# Patient Record
Sex: FEMALE | Race: WHITE | NOT HISPANIC OR LATINO | Employment: OTHER | URBAN - NONMETROPOLITAN AREA
[De-identification: names, ages, dates, MRNs, and addresses within clinical notes are randomized per-mention and may not be internally consistent; named-entity substitution may affect disease eponyms.]

---

## 2018-03-05 ENCOUNTER — HOSPITAL ENCOUNTER (OUTPATIENT)
Dept: LAB | Facility: MEDICAL CENTER | Age: 78
End: 2018-03-05
Payer: MEDICARE

## 2018-03-05 PROCEDURE — 36415 COLL VENOUS BLD VENIPUNCTURE: CPT

## 2018-06-05 ENCOUNTER — APPOINTMENT (RX ONLY)
Dept: URBAN - NONMETROPOLITAN AREA CLINIC 15 | Facility: CLINIC | Age: 78
Setting detail: DERMATOLOGY
End: 2018-06-05

## 2018-06-05 DIAGNOSIS — L85.3 XEROSIS CUTIS: ICD-10-CM

## 2018-06-05 DIAGNOSIS — R21 RASH AND OTHER NONSPECIFIC SKIN ERUPTION: ICD-10-CM

## 2018-06-05 DIAGNOSIS — L29.8 OTHER PRURITUS: ICD-10-CM

## 2018-06-05 DIAGNOSIS — L29.89 OTHER PRURITUS: ICD-10-CM

## 2018-06-05 PROCEDURE — ? ORDER TESTS

## 2018-06-05 PROCEDURE — 99203 OFFICE O/P NEW LOW 30 MIN: CPT | Mod: 25

## 2018-06-05 PROCEDURE — 11101: CPT

## 2018-06-05 PROCEDURE — 11100: CPT

## 2018-06-05 PROCEDURE — ? BIOPSY BY PUNCH METHOD

## 2018-06-05 PROCEDURE — ? PRESCRIPTION

## 2018-06-05 PROCEDURE — ? COUNSELING

## 2018-06-05 PROCEDURE — ? PATIENT SPECIFIC COUNSELING

## 2018-06-05 PROCEDURE — ? BIOPSY BY SHAVE METHOD

## 2018-06-05 RX ORDER — TRIAMCINOLONE ACETONIDE 1 MG/G
CREAM TOPICAL
Qty: 1 | Refills: 3 | Status: ERX | COMMUNITY
Start: 2018-06-05

## 2018-06-05 RX ADMIN — TRIAMCINOLONE ACETONIDE: 1 CREAM TOPICAL at 00:00

## 2018-06-05 ASSESSMENT — LOCATION DETAILED DESCRIPTION DERM
LOCATION DETAILED: INFERIOR THORACIC SPINE
LOCATION DETAILED: LOWER STERNUM
LOCATION DETAILED: LEFT PROXIMAL CALF
LOCATION DETAILED: RIGHT PROXIMAL DORSAL FOREARM
LOCATION DETAILED: LEFT VENTRAL DISTAL FOREARM
LOCATION DETAILED: LEFT RADIAL DORSAL HAND
LOCATION DETAILED: RIGHT PROXIMAL CALF
LOCATION DETAILED: LEFT ANTERIOR DISTAL UPPER ARM
LOCATION DETAILED: RIGHT PROXIMAL PRETIBIAL REGION
LOCATION DETAILED: UPPER STERNUM
LOCATION DETAILED: RIGHT SUPERIOR MEDIAL MIDBACK
LOCATION DETAILED: LEFT PROXIMAL DORSAL FOREARM
LOCATION DETAILED: RIGHT VENTRAL LATERAL DISTAL FOREARM
LOCATION DETAILED: LEFT VENTRAL PROXIMAL FOREARM
LOCATION DETAILED: RIGHT VENTRAL PROXIMAL FOREARM
LOCATION DETAILED: RIGHT RADIAL DORSAL HAND
LOCATION DETAILED: LEFT INFERIOR ANTERIOR NECK
LOCATION DETAILED: LEFT PROXIMAL PRETIBIAL REGION

## 2018-06-05 ASSESSMENT — LOCATION SIMPLE DESCRIPTION DERM
LOCATION SIMPLE: LEFT PRETIBIAL REGION
LOCATION SIMPLE: RIGHT PRETIBIAL REGION
LOCATION SIMPLE: LEFT ANTERIOR NECK
LOCATION SIMPLE: LEFT UPPER ARM
LOCATION SIMPLE: RIGHT LOWER BACK
LOCATION SIMPLE: LEFT FOREARM
LOCATION SIMPLE: LEFT CALF
LOCATION SIMPLE: UPPER BACK
LOCATION SIMPLE: LEFT HAND
LOCATION SIMPLE: CHEST
LOCATION SIMPLE: RIGHT HAND
LOCATION SIMPLE: RIGHT FOREARM
LOCATION SIMPLE: RIGHT CALF

## 2018-06-05 ASSESSMENT — LOCATION ZONE DERM
LOCATION ZONE: LEG
LOCATION ZONE: NECK
LOCATION ZONE: HAND
LOCATION ZONE: TRUNK
LOCATION ZONE: ARM

## 2018-06-05 NOTE — PROCEDURE: BIOPSY BY SHAVE METHOD
Additional Anesthesia Volume In Cc (Will Not Render If 0): 0
Bill For Surgical Tray: no
Anesthesia Type: 1% lidocaine with epinephrine
Hemostasis: Electrocautery
Consent: Written consent was obtained and risks were reviewed including but not limited to scarring, infection, bleeding, scabbing, incomplete removal, nerve damage and allergy to anesthesia.
Lab Facility: 
Wound Care: Vaseline
Anesthesia Volume In Cc: 0.5
Detail Level: Detailed
Dressing: bandage
Curettage Text: The wound bed was treated with curettage after the biopsy was performed.
Notification Instructions: Patient will be notified of biopsy results. However, patient instructed to call the office if not contacted within 2 weeks.
Biopsy Type: H and E
Was A Bandage Applied: Yes
Type Of Destruction Used: Electrodesiccation
Electrodesiccation Text: The wound bed was treated with electrodesiccation after the biopsy was performed.
Biopsy Method: Personna blade
Lab: [id number]
Electrodesiccation And Curettage Text: The wound bed was treated with electrodesiccation and curettage after the biopsy was performed.
Billing Type: Third-Party Bill
Post-Care Instructions: I reviewed with the patient in detail post-care instructions. Patient is to keep the biopsy site dry overnight, and then apply bacitracin twice daily until healed. Patient may apply hydrogen peroxide soaks to remove any crusting.
Cryotherapy Text: The wound bed was treated with cryotherapy after the biopsy was performed.
Silver Nitrate Text: The wound bed was treated with silver nitrate after the biopsy was performed.

## 2018-06-05 NOTE — PROCEDURE: BIOPSY BY PUNCH METHOD
Epidermal Sutures: 4-0 Ethilon
Billing Type: Third-Party Bill
Detail Level: Detailed
Lab: [id number]
Bill 21732 For Specimen Handling/Conveyance To Laboratory?: no
Biopsy Type: H and E
X Size Of Lesion In Cm (Optional): 0
Notification Instructions: Patient will be notified of biopsy results. However, patient instructed to call the office if not contacted within 2 weeks.
Dressing: bandage
Home Suture Removal Text: Patient was provided a home suture removal kit and will remove their sutures at home.  If they have any questions or difficulties they will call the office.
Was A Bandage Applied: Yes
Post-Care Instructions: I reviewed with the patient in detail post-care instructions. Patient is to keep the biopsy site dry overnight, and then apply bacitracin twice daily until healed. Patient may apply hydrogen peroxide soaks to remove any crusting.
Suture Removal: 14 days
Punch Size In Mm: 4
Wound Care: Petrolatum
Consent: Written consent was obtained and risks were reviewed including but not limited to scarring, infection, bleeding, scabbing, incomplete removal, nerve damage and allergy to anesthesia.
Anesthesia Volume In Cc: 0.5
Hemostasis: None
Anesthesia Type: 1% lidocaine with epinephrine
Lab Facility:

## 2018-06-05 NOTE — PROCEDURE: PATIENT SPECIFIC COUNSELING
Detail Level: Detailed
Pt. reports that the rash has been present for years, but not to the extent that it is at this time.  Pt reports that stress makes the rash worse.  She reports that she had a nuclear medicine test which made the rash significantly worse and now she is under a homeopathic physician which has her on a detox which is also bringing out the rash.

## 2018-06-19 ENCOUNTER — APPOINTMENT (RX ONLY)
Dept: URBAN - NONMETROPOLITAN AREA CLINIC 15 | Facility: CLINIC | Age: 78
Setting detail: DERMATOLOGY
End: 2018-06-19

## 2018-06-19 DIAGNOSIS — Z48.02 ENCOUNTER FOR REMOVAL OF SUTURES: ICD-10-CM

## 2018-06-19 PROCEDURE — ? SUTURE REMOVAL (GLOBAL PERIOD)

## 2018-06-19 ASSESSMENT — LOCATION DETAILED DESCRIPTION DERM
LOCATION DETAILED: LEFT VENTRAL PROXIMAL FOREARM
LOCATION DETAILED: LEFT VENTRAL PROXIMAL FOREARM

## 2018-06-19 ASSESSMENT — LOCATION SIMPLE DESCRIPTION DERM
LOCATION SIMPLE: LEFT FOREARM
LOCATION SIMPLE: LEFT FOREARM

## 2018-06-19 ASSESSMENT — LOCATION ZONE DERM
LOCATION ZONE: ARM
LOCATION ZONE: ARM

## 2018-06-19 NOTE — PROCEDURE: SUTURE REMOVAL (GLOBAL PERIOD)
Detail Level: Detailed
Add 02723 Cpt? (Important Note: In 2017 The Use Of 05088 Is Being Tracked By Cms To Determine Future Global Period Reimbursement For Global Periods): no

## 2018-06-26 ENCOUNTER — APPOINTMENT (RX ONLY)
Dept: URBAN - NONMETROPOLITAN AREA CLINIC 15 | Facility: CLINIC | Age: 78
Setting detail: DERMATOLOGY
End: 2018-06-26

## 2018-06-26 DIAGNOSIS — R21 RASH AND OTHER NONSPECIFIC SKIN ERUPTION: ICD-10-CM

## 2018-06-26 PROCEDURE — ? COUNSELING

## 2018-06-26 PROCEDURE — 99212 OFFICE O/P EST SF 10 MIN: CPT

## 2018-06-26 ASSESSMENT — LOCATION SIMPLE DESCRIPTION DERM
LOCATION SIMPLE: RIGHT FOREARM
LOCATION SIMPLE: LEFT PRETIBIAL REGION
LOCATION SIMPLE: LEFT FOREARM
LOCATION SIMPLE: RIGHT PRETIBIAL REGION

## 2018-06-26 ASSESSMENT — LOCATION DETAILED DESCRIPTION DERM
LOCATION DETAILED: RIGHT DISTAL PRETIBIAL REGION
LOCATION DETAILED: RIGHT VENTRAL PROXIMAL FOREARM
LOCATION DETAILED: LEFT DISTAL PRETIBIAL REGION
LOCATION DETAILED: LEFT VENTRAL DISTAL FOREARM

## 2018-06-26 ASSESSMENT — LOCATION ZONE DERM
LOCATION ZONE: ARM
LOCATION ZONE: LEG

## 2018-06-26 NOTE — PROCEDURE: COUNSELING
Patient Specific Counseling (Will Not Stick From Patient To Patient): Reviewed lab work and discussed referring her to a office that specialized in heavy metal toxicity.

## 2018-10-24 ENCOUNTER — APPOINTMENT (RX ONLY)
Dept: URBAN - NONMETROPOLITAN AREA CLINIC 15 | Facility: CLINIC | Age: 78
Setting detail: DERMATOLOGY
End: 2018-10-24

## 2018-10-24 DIAGNOSIS — L20.89 OTHER ATOPIC DERMATITIS: ICD-10-CM

## 2018-10-24 DIAGNOSIS — L85.3 XEROSIS CUTIS: ICD-10-CM

## 2018-10-24 PROBLEM — L20.84 INTRINSIC (ALLERGIC) ECZEMA: Status: ACTIVE | Noted: 2018-10-24

## 2018-10-24 PROCEDURE — ? COUNSELING

## 2018-10-24 PROCEDURE — 99213 OFFICE O/P EST LOW 20 MIN: CPT

## 2018-10-24 ASSESSMENT — LOCATION SIMPLE DESCRIPTION DERM
LOCATION SIMPLE: RIGHT HAND
LOCATION SIMPLE: LEFT HAND

## 2018-10-24 ASSESSMENT — LOCATION DETAILED DESCRIPTION DERM
LOCATION DETAILED: LEFT ULNAR DORSAL HAND
LOCATION DETAILED: RIGHT RADIAL PALM
LOCATION DETAILED: 2ND WEB SPACE RIGHT HAND
LOCATION DETAILED: LEFT RADIAL PALM

## 2018-10-24 ASSESSMENT — LOCATION ZONE DERM: LOCATION ZONE: HAND

## 2018-10-24 NOTE — PROCEDURE: COUNSELING
Detail Level: Zone
Patient Specific Counseling (Will Not Stick From Patient To Patient): Use cetaphil, cerave lotions. Use bag balm or aquaphor at night and cover with cotton socks.

## 2019-03-14 ENCOUNTER — OFFICE VISIT (OUTPATIENT)
Dept: URGENT CARE | Facility: PHYSICIAN GROUP | Age: 79
End: 2019-03-14
Payer: MEDICARE

## 2019-03-14 VITALS
SYSTOLIC BLOOD PRESSURE: 180 MMHG | RESPIRATION RATE: 16 BRPM | DIASTOLIC BLOOD PRESSURE: 110 MMHG | TEMPERATURE: 98.5 F | OXYGEN SATURATION: 94 % | HEART RATE: 92 BPM | WEIGHT: 139 LBS

## 2019-03-14 DIAGNOSIS — R42 DIZZINESS: ICD-10-CM

## 2019-03-14 DIAGNOSIS — I10 MALIGNANT HYPERTENSION: Primary | ICD-10-CM

## 2019-03-14 DIAGNOSIS — R26.81 UNSTABLE GAIT: ICD-10-CM

## 2019-03-14 PROCEDURE — 99215 OFFICE O/P EST HI 40 MIN: CPT | Performed by: PHYSICIAN ASSISTANT

## 2019-03-14 RX ORDER — CLONIDINE HYDROCHLORIDE 0.1 MG/1
0.1 TABLET ORAL ONCE
Status: COMPLETED | OUTPATIENT
Start: 2019-03-14 | End: 2019-03-14

## 2019-03-14 RX ADMIN — CLONIDINE HYDROCHLORIDE 0.1 MG: 0.1 TABLET ORAL at 15:34

## 2019-03-14 NOTE — PROGRESS NOTES
"Subjective:      Pt is a 78 y.o. female who presents with Hypertension            HPI  This is a new problem. Pt notes dizziness, weakness, unstable gait and mild headaches x 3-4 hours and is concerned for \"high blood pressure\". Pt notes she does not take medication to control her BP. Pt has not taken any Rx medications for this condition. Pt states the pain is a 2-3/10, aching in nature and worse when dizzy this morning. Pt denies CP, SOB, NVD, paresthesias, change in vision, hives, or other joint pain. The pt's medication list, problem list, and allergies have been evaluated and reviewed during today's visit.    PMH:  Negative per pt.      PSH:  Negative per pt.      Fam Hx:  the patient's family history is not pertinent to their current complaint      Soc HX:  Social History     Social History   • Marital status:      Spouse name: N/A   • Number of children: N/A   • Years of education: N/A     Occupational History   • Not on file.     Social History Main Topics   • Smoking status: Never Smoker   • Smokeless tobacco: Never Used   • Alcohol use No   • Drug use: No   • Sexual activity: Not on file     Other Topics Concern   • Not on file     Social History Narrative   • No narrative on file         Medications:  No current outpatient prescriptions on file.    Current Facility-Administered Medications:   •  cloNIDine (CATAPRES) tablet 0.1 mg, 0.1 mg, Oral, Once, Michael Villa P.A.-C.      Allergies:  Antibiotic [neomycin-polymyxin b]    ROS  Constitutional: Negative for fever, chills and +malaise/fatigue.   HENT: Negative for congestion and sore throat.    Eyes: Negative for blurred vision, double vision and photophobia.   Respiratory: Negative for cough and shortness of breath.  Cardiovascular: Negative for chest pain and palpitations.   Gastrointestinal: Negative for heartburn, nausea, vomiting, abdominal pain, diarrhea and constipation.   Genitourinary: Negative for dysuria and flank pain. "   Musculoskeletal: Negative for joint pain and myalgias.   Skin: Negative for itching and rash.   Neurological: POS for dizziness, weakness and headaches.   Endo/Heme/Allergies: Does not bruise/bleed easily.   Psychiatric/Behavioral: Negative for depression. The patient is not nervous/anxious.           Objective:     BP (!) 222/102 (BP Location: Right arm, Patient Position: Sitting, BP Cuff Size: Adult)   Pulse 92   Temp 36.9 °C (98.5 °F) (Temporal)   Resp 16   Wt 63 kg (139 lb)   SpO2 94%      Physical Exam      Constitutional: PT is oriented to person, place, and time. PT appears well-developed and well-nourished. No distress.   HENT:   Head: Normocephalic and atraumatic.   Mouth/Throat: Oropharynx is clear and moist. No oropharyngeal exudate.   Eyes: Conjunctivae normal and EOM are normal. Pupils are equal, round, and reactive to light.   Neck: Normal range of motion. Neck supple. No thyromegaly present.   Cardiovascular: Normal rate, regular rhythm, normal heart sounds and intact distal pulses.  Exam reveals no gallop and no friction rub.    No murmur heard.  Pulmonary/Chest: Effort normal and breath sounds normal. No respiratory distress. PT has no wheezes. PT has no rales. Pt exhibits no tenderness.   Abdominal: Soft. Bowel sounds are normal. PT exhibits no distension and no mass. There is no tenderness. There is no rebound and no guarding.   Musculoskeletal: Normal range of motion. PT exhibits no edema and no tenderness.   Neurological: PT is alert and oriented to person, place, and time. PT has normal reflexes. No cranial nerve deficit. Unable to test gait or balance due to pt being very unsteady  Skin: Skin is warm and dry. No rash noted. PT is not diaphoretic. No erythema.       Psychiatric: PT has a normal mood and affect. PT behavior is normal. Judgment and thought content normal.          Assessment/Plan:     1. Malignant hypertension    - cloNIDine (CATAPRES) tablet 0.1 mg; Take 1 Tab by mouth  Once.    2. Dizziness      3. Unstable gait    Pt with malignant HTN with possibility of stroke or MI at any time and will need to be stabilized in ER  BP in clinic is 222/102  After Catapres 0.1mg tab in clinic-->180/110  TO [facility] NOW for further evaluation. Spoke with Dr. Brianne Mckeon on the phone, attending at the ER , and she graciously accepted transfer of care for further imaging and evaluation of the pt. Reiterated to patient that although a provider to provider transfer was made this will not necessarily expedite the ER process.  Pt defers EMS transport to ER and has ride to take her across the street to [facility]  Rest, fluids encouraged.  AVS with medical info given.  Pt was in full understanding and agreement with the plan.  Differential diagnosis, natural history, supportive care, and indications for immediate follow-up discussed. All questions answered. Patient agrees with the plan of care.  Follow-up as needed if symptoms worsen or fail to improve.

## 2019-07-23 ENCOUNTER — APPOINTMENT (OUTPATIENT)
Dept: RADIOLOGY | Facility: MEDICAL CENTER | Age: 79
DRG: 443 | End: 2019-07-23
Attending: EMERGENCY MEDICINE
Payer: MEDICARE

## 2019-07-23 ENCOUNTER — HOSPITAL ENCOUNTER (OUTPATIENT)
Dept: RADIOLOGY | Facility: MEDICAL CENTER | Age: 79
End: 2019-07-23

## 2019-07-23 ENCOUNTER — HOSPITAL ENCOUNTER (INPATIENT)
Facility: MEDICAL CENTER | Age: 79
LOS: 2 days | DRG: 443 | End: 2019-07-25
Attending: EMERGENCY MEDICINE | Admitting: HOSPITALIST
Payer: MEDICARE

## 2019-07-23 PROBLEM — I10 MALIGNANT HYPERTENSION: Status: ACTIVE | Noted: 2019-07-23

## 2019-07-23 PROBLEM — I16.0 HYPERTENSIVE URGENCY, MALIGNANT: Status: ACTIVE | Noted: 2019-07-23

## 2019-07-23 PROBLEM — K76.89 HEPATIC CYST: Status: ACTIVE | Noted: 2019-07-23

## 2019-07-23 LAB
ALBUMIN SERPL BCP-MCNC: 4.1 G/DL (ref 3.2–4.9)
ALBUMIN/GLOB SERPL: 1.5 G/DL
ALP SERPL-CCNC: 102 U/L (ref 30–99)
ALT SERPL-CCNC: 18 U/L (ref 2–50)
ANION GAP SERPL CALC-SCNC: 9 MMOL/L (ref 0–11.9)
APPEARANCE UR: CLEAR
APTT PPP: 34.7 SEC (ref 24.7–36)
AST SERPL-CCNC: 20 U/L (ref 12–45)
BASOPHILS # BLD AUTO: 0.1 % (ref 0–1.8)
BASOPHILS # BLD: 0.01 K/UL (ref 0–0.12)
BILIRUB SERPL-MCNC: 0.4 MG/DL (ref 0.1–1.5)
BILIRUB UR QL STRIP.AUTO: NEGATIVE
BUN SERPL-MCNC: 18 MG/DL (ref 8–22)
CALCIUM SERPL-MCNC: 9.5 MG/DL (ref 8.5–10.5)
CHLORIDE SERPL-SCNC: 104 MMOL/L (ref 96–112)
CO2 SERPL-SCNC: 22 MMOL/L (ref 20–33)
COLOR UR: YELLOW
CREAT SERPL-MCNC: 0.7 MG/DL (ref 0.5–1.4)
EOSINOPHIL # BLD AUTO: 0 K/UL (ref 0–0.51)
EOSINOPHIL NFR BLD: 0 % (ref 0–6.9)
ERYTHROCYTE [DISTWIDTH] IN BLOOD BY AUTOMATED COUNT: 43.3 FL (ref 35.9–50)
GGT SERPL-CCNC: 15 U/L (ref 7–34)
GLOBULIN SER CALC-MCNC: 2.7 G/DL (ref 1.9–3.5)
GLUCOSE SERPL-MCNC: 103 MG/DL (ref 65–99)
GLUCOSE UR STRIP.AUTO-MCNC: NEGATIVE MG/DL
HCT VFR BLD AUTO: 40.8 % (ref 37–47)
HGB BLD-MCNC: 14.1 G/DL (ref 12–16)
IMM GRANULOCYTES # BLD AUTO: 0.08 K/UL (ref 0–0.11)
IMM GRANULOCYTES NFR BLD AUTO: 0.5 % (ref 0–0.9)
INR PPP: 1.02 (ref 0.87–1.13)
KETONES UR STRIP.AUTO-MCNC: NEGATIVE MG/DL
LACTATE BLD-SCNC: 1.2 MMOL/L (ref 0.5–2)
LEUKOCYTE ESTERASE UR QL STRIP.AUTO: NEGATIVE
LIPASE SERPL-CCNC: 13 U/L (ref 11–82)
LYMPHOCYTES # BLD AUTO: 0.85 K/UL (ref 1–4.8)
LYMPHOCYTES NFR BLD: 5.8 % (ref 22–41)
MAGNESIUM SERPL-MCNC: 2.3 MG/DL (ref 1.5–2.5)
MCH RBC QN AUTO: 32.9 PG (ref 27–33)
MCHC RBC AUTO-ENTMCNC: 34.6 G/DL (ref 33.6–35)
MCV RBC AUTO: 95.1 FL (ref 81.4–97.8)
MICRO URNS: NORMAL
MONOCYTES # BLD AUTO: 0.54 K/UL (ref 0–0.85)
MONOCYTES NFR BLD AUTO: 3.7 % (ref 0–13.4)
NEUTROPHILS # BLD AUTO: 13.17 K/UL (ref 2–7.15)
NEUTROPHILS NFR BLD: 89.9 % (ref 44–72)
NITRITE UR QL STRIP.AUTO: NEGATIVE
NRBC # BLD AUTO: 0 K/UL
NRBC BLD-RTO: 0 /100 WBC
PH UR STRIP.AUTO: 5.5 [PH]
PLATELET # BLD AUTO: 276 K/UL (ref 164–446)
PMV BLD AUTO: 9.7 FL (ref 9–12.9)
POTASSIUM SERPL-SCNC: 4.4 MMOL/L (ref 3.6–5.5)
PROT SERPL-MCNC: 6.8 G/DL (ref 6–8.2)
PROT UR QL STRIP: NEGATIVE MG/DL
PROTHROMBIN TIME: 13.6 SEC (ref 12–14.6)
RBC # BLD AUTO: 4.29 M/UL (ref 4.2–5.4)
RBC UR QL AUTO: NEGATIVE
SODIUM SERPL-SCNC: 135 MMOL/L (ref 135–145)
SP GR UR STRIP.AUTO: <=1.005
TROPONIN T SERPL-MCNC: 18 NG/L (ref 6–19)
UROBILINOGEN UR STRIP.AUTO-MCNC: 0.2 MG/DL
WBC # BLD AUTO: 14.7 K/UL (ref 4.8–10.8)

## 2019-07-23 PROCEDURE — 700102 HCHG RX REV CODE 250 W/ 637 OVERRIDE(OP): Performed by: INTERNAL MEDICINE

## 2019-07-23 PROCEDURE — 96367 TX/PROPH/DG ADDL SEQ IV INF: CPT

## 2019-07-23 PROCEDURE — 36415 COLL VENOUS BLD VENIPUNCTURE: CPT

## 2019-07-23 PROCEDURE — 96365 THER/PROPH/DIAG IV INF INIT: CPT

## 2019-07-23 PROCEDURE — 770006 HCHG ROOM/CARE - MED/SURG/GYN SEMI*

## 2019-07-23 PROCEDURE — 83735 ASSAY OF MAGNESIUM: CPT

## 2019-07-23 PROCEDURE — 87040 BLOOD CULTURE FOR BACTERIA: CPT

## 2019-07-23 PROCEDURE — A9270 NON-COVERED ITEM OR SERVICE: HCPCS | Performed by: STUDENT IN AN ORGANIZED HEALTH CARE EDUCATION/TRAINING PROGRAM

## 2019-07-23 PROCEDURE — 700101 HCHG RX REV CODE 250: Performed by: INTERNAL MEDICINE

## 2019-07-23 PROCEDURE — 83605 ASSAY OF LACTIC ACID: CPT

## 2019-07-23 PROCEDURE — 99223 1ST HOSP IP/OBS HIGH 75: CPT | Mod: AI,GC | Performed by: HOSPITALIST

## 2019-07-23 PROCEDURE — 96375 TX/PRO/DX INJ NEW DRUG ADDON: CPT

## 2019-07-23 PROCEDURE — 76705 ECHO EXAM OF ABDOMEN: CPT

## 2019-07-23 PROCEDURE — 81003 URINALYSIS AUTO W/O SCOPE: CPT

## 2019-07-23 PROCEDURE — 84484 ASSAY OF TROPONIN QUANT: CPT

## 2019-07-23 PROCEDURE — 83690 ASSAY OF LIPASE: CPT

## 2019-07-23 PROCEDURE — 85610 PROTHROMBIN TIME: CPT

## 2019-07-23 PROCEDURE — 700102 HCHG RX REV CODE 250 W/ 637 OVERRIDE(OP): Performed by: STUDENT IN AN ORGANIZED HEALTH CARE EDUCATION/TRAINING PROGRAM

## 2019-07-23 PROCEDURE — 85730 THROMBOPLASTIN TIME PARTIAL: CPT

## 2019-07-23 PROCEDURE — 99285 EMERGENCY DEPT VISIT HI MDM: CPT

## 2019-07-23 PROCEDURE — 700105 HCHG RX REV CODE 258: Performed by: INTERNAL MEDICINE

## 2019-07-23 PROCEDURE — 82977 ASSAY OF GGT: CPT

## 2019-07-23 PROCEDURE — 700111 HCHG RX REV CODE 636 W/ 250 OVERRIDE (IP): Performed by: EMERGENCY MEDICINE

## 2019-07-23 PROCEDURE — A9270 NON-COVERED ITEM OR SERVICE: HCPCS | Performed by: INTERNAL MEDICINE

## 2019-07-23 PROCEDURE — 80053 COMPREHEN METABOLIC PANEL: CPT

## 2019-07-23 PROCEDURE — 85025 COMPLETE CBC W/AUTO DIFF WBC: CPT

## 2019-07-23 PROCEDURE — 700111 HCHG RX REV CODE 636 W/ 250 OVERRIDE (IP): Performed by: INTERNAL MEDICINE

## 2019-07-23 RX ORDER — ACETAMINOPHEN 325 MG/1
650 TABLET ORAL 2 TIMES DAILY
Status: ON HOLD | COMMUNITY
End: 2019-09-09

## 2019-07-23 RX ORDER — GABAPENTIN 300 MG/1
300 CAPSULE ORAL
Status: DISCONTINUED | OUTPATIENT
Start: 2019-07-23 | End: 2019-07-25 | Stop reason: HOSPADM

## 2019-07-23 RX ORDER — ACETAMINOPHEN 325 MG/1
650 TABLET ORAL EVERY 4 HOURS
Status: DISCONTINUED | OUTPATIENT
Start: 2019-07-23 | End: 2019-07-25 | Stop reason: HOSPADM

## 2019-07-23 RX ORDER — ONDANSETRON 2 MG/ML
4 INJECTION INTRAMUSCULAR; INTRAVENOUS EVERY 4 HOURS PRN
Status: DISCONTINUED | OUTPATIENT
Start: 2019-07-23 | End: 2019-07-23

## 2019-07-23 RX ORDER — GABAPENTIN 100 MG/1
100 CAPSULE ORAL EVERY MORNING
Status: DISCONTINUED | OUTPATIENT
Start: 2019-07-24 | End: 2019-07-25 | Stop reason: HOSPADM

## 2019-07-23 RX ORDER — HYDRALAZINE HYDROCHLORIDE 20 MG/ML
20 INJECTION INTRAMUSCULAR; INTRAVENOUS EVERY 6 HOURS PRN
Status: DISCONTINUED | OUTPATIENT
Start: 2019-07-23 | End: 2019-07-23

## 2019-07-23 RX ORDER — BISACODYL 10 MG
10 SUPPOSITORY, RECTAL RECTAL
Status: DISCONTINUED | OUTPATIENT
Start: 2019-07-23 | End: 2019-07-25 | Stop reason: HOSPADM

## 2019-07-23 RX ORDER — GABAPENTIN 100 MG/1
100 CAPSULE ORAL EVERY MORNING
Status: ON HOLD | COMMUNITY
End: 2019-09-09

## 2019-07-23 RX ORDER — LABETALOL HYDROCHLORIDE 5 MG/ML
10 INJECTION, SOLUTION INTRAVENOUS EVERY 4 HOURS PRN
Status: DISCONTINUED | OUTPATIENT
Start: 2019-07-23 | End: 2019-07-23

## 2019-07-23 RX ORDER — POLYETHYLENE GLYCOL 3350 17 G/17G
1 POWDER, FOR SOLUTION ORAL
Status: DISCONTINUED | OUTPATIENT
Start: 2019-07-23 | End: 2019-07-25 | Stop reason: HOSPADM

## 2019-07-23 RX ORDER — LOSARTAN POTASSIUM 50 MG/1
50 TABLET ORAL DAILY
Status: ON HOLD | COMMUNITY
End: 2019-07-25

## 2019-07-23 RX ORDER — ONDANSETRON 4 MG/1
4 TABLET, ORALLY DISINTEGRATING ORAL EVERY 4 HOURS PRN
Status: DISCONTINUED | OUTPATIENT
Start: 2019-07-23 | End: 2019-07-23

## 2019-07-23 RX ORDER — TRAMADOL HYDROCHLORIDE 50 MG/1
50 TABLET ORAL EVERY 12 HOURS PRN
Status: DISCONTINUED | OUTPATIENT
Start: 2019-07-23 | End: 2019-07-25

## 2019-07-23 RX ORDER — CHLORTHALIDONE 25 MG/1
25 TABLET ORAL
Status: DISCONTINUED | OUTPATIENT
Start: 2019-07-23 | End: 2019-07-23

## 2019-07-23 RX ORDER — LOSARTAN POTASSIUM 50 MG/1
50 TABLET ORAL
Status: DISCONTINUED | OUTPATIENT
Start: 2019-07-23 | End: 2019-07-23

## 2019-07-23 RX ORDER — TRAMADOL HYDROCHLORIDE 50 MG/1
50 TABLET ORAL EVERY 4 HOURS PRN
Status: ON HOLD | COMMUNITY
End: 2019-09-09

## 2019-07-23 RX ORDER — HYDRALAZINE HYDROCHLORIDE 50 MG/1
25 TABLET, FILM COATED ORAL EVERY 6 HOURS PRN
Status: DISCONTINUED | OUTPATIENT
Start: 2019-07-23 | End: 2019-07-25 | Stop reason: HOSPADM

## 2019-07-23 RX ORDER — HYDRALAZINE HYDROCHLORIDE 20 MG/ML
20 INJECTION INTRAMUSCULAR; INTRAVENOUS ONCE
Status: COMPLETED | OUTPATIENT
Start: 2019-07-23 | End: 2019-07-23

## 2019-07-23 RX ORDER — AMOXICILLIN 250 MG
2 CAPSULE ORAL 2 TIMES DAILY
Status: DISCONTINUED | OUTPATIENT
Start: 2019-07-23 | End: 2019-07-25 | Stop reason: HOSPADM

## 2019-07-23 RX ORDER — HYDRALAZINE HYDROCHLORIDE 25 MG/1
25 TABLET, FILM COATED ORAL 4 TIMES DAILY PRN
COMMUNITY
End: 2019-08-27 | Stop reason: SDUPTHER

## 2019-07-23 RX ORDER — DOCUSATE SODIUM 100 MG/1
100 CAPSULE, LIQUID FILLED ORAL 2 TIMES DAILY
Status: ON HOLD | COMMUNITY
End: 2019-09-09

## 2019-07-23 RX ORDER — GABAPENTIN 300 MG/1
300 CAPSULE ORAL
Status: ON HOLD | COMMUNITY
End: 2019-09-09

## 2019-07-23 RX ADMIN — ACETAMINOPHEN 650 MG: 325 TABLET, FILM COATED ORAL at 20:23

## 2019-07-23 RX ADMIN — HYDRALAZINE HYDROCHLORIDE 25 MG: 50 TABLET, FILM COATED ORAL at 20:24

## 2019-07-23 RX ADMIN — METRONIDAZOLE 500 MG: 500 INJECTION, SOLUTION INTRAVENOUS at 21:31

## 2019-07-23 RX ADMIN — HYDRALAZINE HYDROCHLORIDE 20 MG: 20 INJECTION INTRAMUSCULAR; INTRAVENOUS at 12:02

## 2019-07-23 RX ADMIN — CEFTRIAXONE SODIUM 1 G: 1 INJECTION, POWDER, FOR SOLUTION INTRAMUSCULAR; INTRAVENOUS at 15:57

## 2019-07-23 RX ADMIN — TRAMADOL HYDROCHLORIDE 50 MG: 50 TABLET, FILM COATED ORAL at 21:53

## 2019-07-23 RX ADMIN — GABAPENTIN 300 MG: 300 CAPSULE ORAL at 20:25

## 2019-07-23 RX ADMIN — METRONIDAZOLE 500 MG: 500 INJECTION, SOLUTION INTRAVENOUS at 16:55

## 2019-07-23 ASSESSMENT — ENCOUNTER SYMPTOMS
EYE DISCHARGE: 0
SHORTNESS OF BREATH: 1
DEPRESSION: 0
ABDOMINAL PAIN: 1
EYE PAIN: 0
COUGH: 0
TREMORS: 0
VOMITING: 0
NECK PAIN: 0
DIARRHEA: 0
ORTHOPNEA: 0
CHILLS: 0
BACK PAIN: 0
CONSTIPATION: 0
PALPITATIONS: 0
HEADACHES: 0
BLURRED VISION: 1
DIAPHORESIS: 1
BRUISES/BLEEDS EASILY: 0
NAUSEA: 0
TINGLING: 0
FEVER: 0
HEMOPTYSIS: 0
DOUBLE VISION: 0
DIZZINESS: 1
MYALGIAS: 0
PHOTOPHOBIA: 0
WEAKNESS: 0
HEARTBURN: 0
SPUTUM PRODUCTION: 0

## 2019-07-23 ASSESSMENT — COGNITIVE AND FUNCTIONAL STATUS - GENERAL
CLIMB 3 TO 5 STEPS WITH RAILING: A LITTLE
SUGGESTED CMS G CODE MODIFIER DAILY ACTIVITY: CH
MOBILITY SCORE: 23
DAILY ACTIVITIY SCORE: 24
SUGGESTED CMS G CODE MODIFIER MOBILITY: CI

## 2019-07-23 ASSESSMENT — COPD QUESTIONNAIRES
COPD SCREENING SCORE: 6
IN THE PAST 12 MONTHS DO YOU DO LESS THAN YOU USED TO BECAUSE OF YOUR BREATHING PROBLEMS: DISAGREE/UNSURE
HAVE YOU SMOKED AT LEAST 100 CIGARETTES IN YOUR ENTIRE LIFE: YES
DURING THE PAST 4 WEEKS HOW MUCH DID YOU FEEL SHORT OF BREATH: NONE/LITTLE OF THE TIME
DO YOU EVER COUGH UP ANY MUCUS OR PHLEGM?: YES, EVERY DAY

## 2019-07-23 ASSESSMENT — PATIENT HEALTH QUESTIONNAIRE - PHQ9
2. FEELING DOWN, DEPRESSED, IRRITABLE, OR HOPELESS: NOT AT ALL
1. LITTLE INTEREST OR PLEASURE IN DOING THINGS: NOT AT ALL
SUM OF ALL RESPONSES TO PHQ9 QUESTIONS 1 AND 2: 0

## 2019-07-23 ASSESSMENT — LIFESTYLE VARIABLES
ALCOHOL_USE: NO
EVER_SMOKED: YES
SUBSTANCE_ABUSE: 0

## 2019-07-23 NOTE — SENIOR ADMIT NOTE
-A/P:  Simple cyst.  Perc aspiration with histology?  Perc aspiration with sclerosing agent vs laproscopic wide unroofing.   -DDX:  Hepatic mucinous cystic neoplasm (imaging thickened irregular walls).    -Echinococcal cysts (IgG HANK and imaging.).  must be ruled out with  -liver abcess:  Drain, empiric abx c3 metro   -Edu:  Large cysts can create atrophy.      -HPI:  EG pressure and aching.  SOB last Friday afternoon.  Voice is lower.    -ROS:    -Neg:  F/C, CP.  Pee poo.    -PO tolerant.      7/12 saw denia.    1 wk worsening light pressure.    Thursday Echo cardiogram.   [place] emergency. Friday night.  Tramadol, tylenol, poo  [facility].    -Hx:  Chronic venous insufficiency.  [facility].  -Neg HX:  Vegetarian diet.    -2014 US showed cyst.  Felt some pain then.  Shooting.    -[place] [place]  Pigs, cows, chickens, duck, no sheep.    -BM:  Normal  Just now.    -Cardiology:  Layton.  Home hydralazine.    -Allergies: lisinopril caused cough.   -HTN:  Took medications today.      -A/P:  C3, metro, npo at [facility].  Regular.  vegetarin diet.      [facility] INTERNAL [facility]ICINE SENIOR ADMIT NOTE:    Patient ID:   Name:             Catherine Huitron     YOB: 1940  Age:                 79 y.o.  female   MRN:               [id number]                                                          Chief Complaint:       Abdominal pain, shortness of breath    History of Present Illness:    Ms. Huitron is a 79 y.o. female with a PMHx of hypertension (follows with Dr. Layton), chronic venous insufficiency.  She presents with upper abdominal pain and shortness of breath.  -2014, patient first notes upper abdominal discomfort and performs an abdominal ultrasound that shows liver cyst.  She says given its appearance, no further interventions were advised at the time  -7/12/2019.  Patient saw her cardiologist and was started on a new antihypertensive regimen including losartan and PRN hydralazine  -7/16/2019.  Patient  "notes experiencing upper quadrant abdominal aching and \"pressure.\"  -7/18/2019.  Echocardiogram performed and patient noted abdominal discomfort during study.  -7/19/2019.  Patient started experiencing shortness of breath.  Abdominal pain and distention.  She went to [place], CT abdomen was performed showing liver cysts.  She was discharged tramadol and advised to seek care at [facility].  Patient showed a blood pressure journal that showed systolic in the 170s to 180s.  At bedside in the ED, the patient reported recently having a normal bowel movement.    ROS: Positive for abdominal pressure and pain.  Shortness of breath  EX: General- pleasant, no acute distress.  CV-regular rate and rhythm.  Respirations-bilateral clear to auscultation.  Abdomen- tender to palpation in the upper quadrants.  Soreness in right upper quadrant.  Normal bowel sounds  LABS: Positive for leukocytosis (14.7), absolute neutrophil count elevation (13.17).  Elevated alkaline phosphatase 102.  No transaminitis.  Urinalysis normal.  INR 1.02.  IMAGING: Right upper quadrant ultrasound positive for large hepatic cyst with smaller adjacent hepatic cyst.  As read by [facility] team, no septation.    Active Ambulatory Problems     Diagnosis Date Noted   • No Active Ambulatory Problems     Resolved Ambulatory Problems     Diagnosis Date Noted   • No Resolved Ambulatory Problems     No Additional Past Medical History       PHYSICAL EXAM  Vitals:   Weight/BMI: Body mass index is 27.14 kg/m².  BP (!) 171/66   Pulse 68   Temp 36.6 °C (97.9 °F) (Temporal)   Resp 16   Ht 1.6 m (5' 3\")   Wt 69.5 kg (153 lb 3.5 oz)   SpO2 98%   Vitals:    07/23/19 1026 07/23/19 1100 07/23/19 1130 07/23/19 1205   BP:  (!) 180/75 (!) 170/70 (!) 171/66   Pulse:  76 72 68   Resp:  17 15 16   Temp:       TempSrc:       SpO2:    98%   Weight: 69.5 kg (153 lb 3.5 oz)      Height: 1.6 m (5' 3\")        Oxygen Therapy:  Pulse Oximetry: 98 %, O2 Delivery: None (Room " "Air)      IMPRESSION  -79-year-old female with a history of hypertension and liver cyst first documented 2014 presents with worsening pain and leukocytosis in association with liver cyst.    ASSESSMENT:  # Liver mass  -Symptoms:  Abdominal pain, shortness of breath.  -Concern for abscess given leukocytosis  -Imaging does not support dx of echinococcus cyst..    -Cyst first document on US in 2014.    [place]  No recent travel.      # Hypertensive urgency  -Follows with Dr. Layton  -At home on PRN hydralazine and losartain    # Lisinopril reaction  -Experienced cough and \"puffy cheeks.\"      PLAN:  -Admit to medicine  -Plan for IR drainage 7/24/2019 in the morning.  Vegetarian diet.  NPO at midnight  -Continual pulse ox  -Pain control with scheduled tylenol and PRN tramadol.  -Emperic C3 Metro for liver abscess.    -BP control with home meds, but increased losartan to 75 d.    -Hold losartan  -DVT PPX:  SCD before IR drainage.  Lovenox afterwards.    Please refer to Interns  H&P for complete admission details.        "

## 2019-07-23 NOTE — ASSESSMENT & PLAN NOTE
-SBP range on floor now 110s to 140s.  One time high of 186.  -On admission had hypertensive urgency  -Home SBP 170s to 180s for 2 months prior to admission despite PRN hydralazine and losartan.  -In ED:  SBP in the 170 to 180.  She required IV hydralazine for control  -No evidence of endorgan dominant as evidenced by negative troponin, normal creatinine, normal mentation.  -Follows with Dr. Layton, cardiology and received home antihypertensive regiment of scheduled losartan and PRN hydralazine.  -Hospital course:  Increased home losartan 75 mg daily.  Provided PRN hydralazine. She continued to have no evidence of end organ damage  -Discharge plan:  Patient to follow up with Dr. Layton as scheduled.  Discharging with losartan 75 mg daily and no change in PRN hydralazine dose. She was advised to continue monitoring her blood pressure.  She was instructed to return to her prior dose of losartan if she consistently had systolic blood pressures less than 110.

## 2019-07-23 NOTE — ED NOTES
"To 17 with same report as triage note.  Denies pain at this time, reports \"just pressure\".  Reports took tylenol and tramadol this am.  "

## 2019-07-23 NOTE — ED PROVIDER NOTES
ED Provider Note    CHIEF COMPLAINT  Chief Complaint   Patient presents with   • Bloating   • Abdominal Pain       HPI  Catherine Huitron is a 79 y.o. female who presents for evaluation of epigastric discomfort bloating.  The patient clearly has a history of a small liver cyst initially detected on ultrasound several years ago.  [place] [facility] a profoundly enlarged cyst at 12 x 1 cm.  She was referred here.  She reports worsening pain.  She also reports subjective fever.  She is passing gas.  She has no history of exotic or foreign travel no history of IV drug use or alcohol abuse.    REVIEW OF SYSTEMS  See HPI for further details.  Positive for abdominal pain and distention all other systems are negative.     PAST MEDICAL HISTORY  No past medical history on file.  Hypertension  FAMILY HISTORY  Noncontributory    SOCIAL HISTORY  Social History     Social History   • Marital status:      Spouse name: N/A   • Number of children: N/A   • Years of education: N/A     Social History Main Topics   • Smoking status: Never Smoker   • Smokeless tobacco: Never Used   • Alcohol use No   • Drug use: No   • Sexual activity: Not on file     Other Topics Concern   • Not on file     Social History Narrative   • No narrative on file     Denies IV drug  SURGICAL HISTORY  No past surgical history on file.  No abdominal surgeries  CURRENT MEDICATIONS  Home Medications     Reviewed by Jalyn Beckham (Pharmacy Tech) on 07/23/19 at 1418  Med List Status: Complete   Medication Last Dose Status   acetaminophen (TYLENOL) 325 MG Tab 7/23/2019 Active   docusate sodium (COLACE) 100 MG Cap 7/23/2019 Active   gabapentin (NEURONTIN) 100 MG Cap 7/23/2019 Active   gabapentin (NEURONTIN) 300 MG Cap 7/22/2019 Active   hydrALAZINE (APRESOLINE) 25 MG Tab 7/22/2019 Active   losartan (COZAAR) 50 MG Tab 7/23/2019 Active   tramadol (ULTRAM) 50 MG Tab 7/23/2019 Active                ALLERGIES  Allergies  "  Allergen Reactions   • Antibiotic [Neomycin-Polymyxin B]      Can't take any antibiotics       PHYSICAL EXAM  VITAL SIGNS: /73   Pulse 67   Temp 36.6 °C (97.9 °F) (Temporal)   Resp 16   Ht 1.6 m (5' 3\")   Wt 69.5 kg (153 lb 3.5 oz)   SpO2 98%   BMI 27.14 kg/m²       Constitutional: Well developed, Well nourished, No acute distress, Non-toxic appearance.   HENT: Normocephalic, Atraumatic, Bilateral external ears normal, Oropharynx moist, No oral exudates, Nose normal.   Eyes: PERRLA, EOMI, Conjunctiva normal, No discharge.   Neck: Normal range of motion, No tenderness, Supple, No stridor.   Cardiovascular: Normal heart rate, Normal rhythm, No murmurs, No rubs, No gallops.   Thorax & Lungs: Normal breath sounds, No respiratory distress, No wheezing, No chest tenderness.   Abdomen: Bowel sounds normal, Soft, moderate epigastric discomfort, No masses, No pulsatile masses.   Skin: Warm, Dry, No erythema, No rash.   Back: No tenderness, No CVA tenderness.   Extremities: Intact distal pulses, No edema, No tenderness, No cyanosis, No clubbing.   Musculoskeletal: Good range of motion in all major joints. No tenderness to palpation or major deformities noted.   Neurologic: Alert & oriented x 3, Normal motor function, Normal sensory function, No focal deficits noted.   Psychiatric: Anxious  Results for orders placed or performed during the hospital encounter of 07/23/19   CBC WITH DIFFERENTIAL   Result Value Ref Range    WBC 14.7 (H) 4.8 - 10.8 K/uL    RBC 4.29 4.20 - 5.40 M/uL    Hemoglobin 14.1 12.0 - 16.0 g/dL    Hematocrit 40.8 37.0 - 47.0 %    MCV 95.1 81.4 - 97.8 fL    MCH 32.9 27.0 - 33.0 pg    MCHC 34.6 33.6 - 35.0 g/dL    RDW 43.3 35.9 - 50.0 fL    Platelet Count 276 164 - 446 K/uL    MPV 9.7 9.0 - 12.9 fL    Neutrophils-Polys 89.90 (H) 44.00 - 72.00 %    Lymphocytes 5.80 (L) 22.00 - 41.00 %    Monocytes 3.70 0.00 - 13.40 %    Eosinophils 0.00 0.00 - 6.90 %    Basophils 0.10 0.00 - 1.80 %    Immature " Granulocytes 0.50 0.00 - 0.90 %    Nucleated RBC 0.00 /100 WBC    Neutrophils (Absolute) 13.17 (H) 2.00 - 7.15 K/uL    Lymphs (Absolute) 0.85 (L) 1.00 - 4.80 K/uL    Monos (Absolute) 0.54 0.00 - 0.85 K/uL    Eos (Absolute) 0.00 0.00 - 0.51 K/uL    Baso (Absolute) 0.01 0.00 - 0.12 K/uL    Immature Granulocytes (abs) 0.08 0.00 - 0.11 K/uL    NRBC (Absolute) 0.00 K/uL   LIPASE   Result Value Ref Range    Lipase 13 11 - 82 U/L   Comp Metabolic Panel   Result Value Ref Range    Sodium 135 135 - 145 mmol/L    Potassium 4.4 3.6 - 5.5 mmol/L    Chloride 104 96 - 112 mmol/L    Co2 22 20 - 33 mmol/L    Anion Gap 9.0 0.0 - 11.9    Glucose 103 (H) 65 - 99 mg/dL    Bun 18 8 - 22 mg/dL    Creatinine 0.70 0.50 - 1.40 mg/dL    Calcium 9.5 8.5 - 10.5 mg/dL    AST(SGOT) 20 12 - 45 U/L    ALT(SGPT) 18 2 - 50 U/L    Alkaline Phosphatase 102 (H) 30 - 99 U/L    Total Bilirubin 0.4 0.1 - 1.5 mg/dL    Albumin 4.1 3.2 - 4.9 g/dL    Total Protein 6.8 6.0 - 8.2 g/dL    Globulin 2.7 1.9 - 3.5 g/dL    A-G Ratio 1.5 g/dL   URINALYSIS   Result Value Ref Range    Color Yellow     Character Clear     Specific Gravity <=1.005 <1.035    Ph 5.5 5.0 - 8.0    Glucose Negative Negative mg/dL    Ketones Negative Negative mg/dL    Protein Negative Negative mg/dL    Bilirubin Negative Negative    Urobilinogen, Urine 0.2 Negative    Nitrite Negative Negative    Leukocyte Esterase Negative Negative    Occult Blood Negative Negative    Micro Urine Req see below    ESTIMATED GFR   Result Value Ref Range    GFR If African American >60 >60 mL/min/1.73 m 2    GFR If Non African American >60 >60 mL/min/1.73 m 2   Prothrombin Time   Result Value Ref Range    PT 13.6 12.0 - 14.6 sec    INR 1.02 0.87 - 1.13   APTT   Result Value Ref Range    APTT 34.7 24.7 - 36.0 sec      US-RUQ   Final Result      Large hepatic cyst. Smaller adjacent hepatic cyst is also seen.      No evidence of gallstones or biliary ductal dilatation.      Limited evaluation of the pancreatic body  and tail.      Atherosclerotic plaque.            CT-FOREIGN FILM CAT SCAN   Final Result          COURSE & MEDICAL DECISION MAKING  Pertinent Labs & Imaging studies reviewed. (See chart for details)  I reviewed the patient's CT scan and consulted Dr. Florecita howard.  He reviewed the images and agrees that this could possibly be infected given her elevated white blood cell count.  She has no other clear source of infection.  We recommend getting an ultrasound to better clarify the lesion.  He recommended keeping her nothing by mouth and have internal medicine put in order for IR percutaneous drain.  FINAL IMPRESSION  1.  Liver cyst with possible infection    Admission         Electronically signed by: David Silvestre, 7/23/2019 11:05 AM

## 2019-07-23 NOTE — ED TRIAGE NOTES
"Pt seen in [place] on Friday night for \"cyst on liver\". Pt c/o abdominal pain and bloating. Pt abd distended and firm. Pt c/o SOB due to distention.   "

## 2019-07-23 NOTE — ED NOTES
Med rec updated and complete. Allergies reviewed.  Pt denies oral antibiotic use in last 14 days at home. Pt  Currently has her medications at bedside. Pt is unable to  Has someone take her medications back home at this time.  Explained process. Verbalized understanding.  Home pharmacy [facility].

## 2019-07-23 NOTE — ASSESSMENT & PLAN NOTE
-Diagnosis of simple cyst given imaging showing no septations and initial fluid analysis with no organisms seen.    -Symptom:  Upper abdominal pain.  No fever or chills.  -On admission:  WBC 14.7 ANC 13. On discharge, WBC 6.6  -Cyst first ID 2014, but did not warrant intervention at that time.    -CT abdomen at [place] showed a nonseptated 12cm hypoechoic liver mass  -US at [facility] showed a large hepatic cyst with a smaller adjacent hepatic cyst.  No evidence of gallstones or biliary ductal dilatation.  -Differentials include hydatid cyst, Hepatic mucinous cystic neoplasm, but this is not supported by fluid analysis at this time.  -IR liver drainage 7/24- drained 680ml serous fluid showing no organisms, rare WBCs, culture report pending  -Discussed case with surgery.  If asymptomatic, no need for inpatient surgery consult.  She is ok for outpatient referral.  -Hospital course:  Started ceftriaxone and metronidazole on admission, given concern for liver abscess.  IR liver drainage 7/24.  Antibiotics stopped 7/25 after fluid analysis negative for organisim.  No antibiotics on discharge.  Discussed with pharmacy ([facility])  -Discharge plan:  Discharged 7/25.  She is scheduled to see primary care in the next week.  Pending studies include liver fluid culture and liver fluid cytology. The patient requested a referral to an appropriate surgeon that could perform a laproscopic cyst unroofing when she becomes symptomatic again.  Dr. Vinny Aquino is a general surgeon in [place] with a subspecialty in hepatobiliary surgery.  Referral by primary care to Dr. Aquino in 3-4 weeks would be appropriate.  If pending cytology and culture suggest an etiology other than simple cyst she would benefit from a referral to a hepatologist such as Dr. Олег Pickett in [place] with [facility].  She was discharged with no antibiotics and no pain medications given resolution of pain.

## 2019-07-23 NOTE — PROGRESS NOTES
Internal Medicine Admitting History and Physical    Note Author: Sveta Arevalo M.D.       Name Catherine Huitron 1940   Age/Sex 79 y.o. female   MRN [id number]   Code Status FULL     After 5PM or if no immediate response to page, please call for cross-coverage  Attending/Team: Dr. Sewell/KAYLEE See Patient List for primary contact information  Call [phone] to page    1st Call - Day Intern (R1):   Dr. Arevalo 2nd Call - Day Sr. Resident (R2/R3):   Dr. Stafford       Chief Complaint:   Epigastric pressure  Hypertension    HPI:  Patient is a 79-year-old female with past medical history of chronic venous insufficiency, hypertension,Hepatic cyst diagnosed in , who presented with epigastric pressure and malignant hypertension.  Patient was recently diagnosed with malignant hypertension, and underwent an echocardiogram last Thursday following which she developed abdominal pain, abdominal pressure, abdominal distention, shortness of breath and lightheadedness.  On Friday she went to [facility] for her symptoms.  She was discharged on pain medication-gabapentin, tramadol and stool softeners which brought down the pain and abdominal distention.  By  morning the abdominal pressure began to increase again location.  This morning patient felt overly anxious, her heart rate went up to 102 and her blood pressure was 192/94.  Patient says she has abdominal pressure in the epigastric region, with shooting pains.  There is no association of pain with position or eating, no nausea or vomiting, diarrhea or constipation.    Patient was diagnosed with a hepatic cyst in [facility] in  when she had abdominal pain.  She was told the cyst is benign and did not need any active intervention.  Since then she has had mild shooting pains periodically, not requiring any medications.    Of note, patient has been having high blood pressure with systolic blood pressure in the 170s and 180s in the last 2 months.   She also complains of intermittent blurriness of the vision for the last 2 months.  Patient says she has unintentionally gained 22 pounds in the last 3 to 4 months. No history of chest pain, headache, palpitations.  In the ED, patient was found to have an elevated white count of 14.7 with absolute neutrophil count of 13.17.Ultrasound right upper quadrant revealed a large hepatic cyst in the left lobe, and an adjacent smaller cyst.  Outside CT abdomen on July 19, 2019 shows concurrent findings, with calcified granuloma in the right lower lobe of lung and a small calcified granuloma in the spleen.  Outside lab report on July 18, 2019 showed a white count of 10.8 with ANC of 7.7.    Review of Systems   Constitutional: Positive for diaphoresis. Negative for chills and fever.        22lbs weight gain in last 3-4 months   HENT: Negative for ear pain, hearing loss and tinnitus.    Eyes: Positive for blurred vision (Intermittent blurry vision for 2 months). Negative for double vision, photophobia, pain and discharge.   Respiratory: Positive for shortness of breath. Negative for cough, hemoptysis and sputum production.    Cardiovascular: Positive for leg swelling. Negative for chest pain, palpitations and orthopnea.   Gastrointestinal: Positive for abdominal pain. Negative for constipation, diarrhea, heartburn, nausea and vomiting.   Genitourinary: Negative for dysuria, frequency and urgency.   Musculoskeletal: Negative for back pain, myalgias and neck pain.   Neurological: Positive for dizziness (lightheadedness). Negative for tingling, tremors, weakness and headaches.   Endo/Heme/Allergies: Does not bruise/bleed easily.   Psychiatric/Behavioral: Negative for depression, substance abuse and suicidal ideas.       Past Medical History (Chronic medical problem, known complications and current treatment)    Past Medical History:   Diagnosis Date   • Chronic venous insufficiency 2018   • Hypertension          Past Surgical  "History:  Past Surgical History:   Procedure Laterality Date   • SCLEROTHERAPHY Right 10/2018       Current Outpatient Medications:  Home Medications     Reviewed by Jalyn Beckham (Pharmacy Tech) on 07/23/19 at 1418  Med List Status: Complete   Medication Last Dose Status   acetaminophen (TYLENOL) 325 MG Tab 7/23/2019 Active   docusate sodium (COLACE) 100 MG Cap 7/23/2019 Active   gabapentin (NEURONTIN) 100 MG Cap 7/23/2019 Active   gabapentin (NEURONTIN) 300 MG Cap 7/22/2019 Active   hydrALAZINE (APRESOLINE) 25 MG Tab 7/22/2019 Active   losartan (COZAAR) 50 MG Tab 7/23/2019 Active   tramadol (ULTRAM) 50 MG Tab 7/23/2019 Active                Medication Allergy/Sensitivities:  Allergies   Allergen Reactions   • Antibiotic [Neomycin-Polymyxin B]      Can't take any antibiotics   • Lisinopril      \"puffy\" and cough.  Possible angioedema         Family History (mandatory)   Family History   Problem Relation Age of Onset   • Cancer Father        Social History (mandatory)   Social History     Social History   • Marital status:      Spouse name: N/A   • Number of children: N/A   • Years of education: N/A     Occupational History   • Not on file.     Social History Main Topics   • Smoking status: Never Smoker   • Smokeless tobacco: Never Used   • Alcohol use No   • Drug use: No   • Sexual activity: Not on file     Other Topics Concern   • Not on file     Social History Narrative   • No narrative on file     Living situation: Lives by herself in [place]  PCP : Gregory Munoz D.O.    Physical Exam     Vitals:    07/23/19 1130 07/23/19 1205 07/23/19 1335 07/23/19 1430   BP: (!) 170/70 (!) 171/66 158/73 (!) 168/70   Pulse: 72 68 67 81   Resp: 15 16 16 16   Temp:       TempSrc:       SpO2:  98% 98%    Weight:       Height:         Body mass index is 27.14 kg/m².  O2 therapy: Pulse Oximetry: 98 %, O2 Delivery: None (Room Air)    Physical Exam   Constitutional: She is oriented to person, place, and time and " well-developed, well-nourished, and in no distress. No distress.   HENT:   Head: Normocephalic and atraumatic.   Mouth/Throat: Oropharynx is clear and moist.   Mallampati 3   Eyes: Pupils are equal, round, and reactive to light. Conjunctivae and EOM are normal. No scleral icterus.   Neck: Normal range of motion. Neck supple. No JVD present. No tracheal deviation present. No thyromegaly present.   Cardiovascular: Normal rate, regular rhythm and normal heart sounds.    Right dorsalis pedis not palpable   Pulmonary/Chest: Effort normal and breath sounds normal. No respiratory distress. She has no wheezes. She has no rales.   Abdominal: Soft. Bowel sounds are normal. She exhibits no distension. There is no tenderness. There is no rebound.   Musculoskeletal: Normal range of motion. She exhibits edema (3+ pitting edema B/l upto calves).   Neurological: She is alert and oriented to person, place, and time. No cranial nerve deficit.   Skin: Skin is warm and dry. No rash noted. She is not diaphoretic. No erythema.   Psychiatric: Mood and affect normal.         Data Review       Old Records Request:   Completed  Current Records review/summary: Completed    Lab Data Review:  Recent Results (from the past 24 hour(s))   CBC WITH DIFFERENTIAL    Collection Time: 07/23/19 11:22 AM   Result Value Ref Range    WBC 14.7 (H) 4.8 - 10.8 K/uL    RBC 4.29 4.20 - 5.40 M/uL    Hemoglobin 14.1 12.0 - 16.0 g/dL    Hematocrit 40.8 37.0 - 47.0 %    MCV 95.1 81.4 - 97.8 fL    MCH 32.9 27.0 - 33.0 pg    MCHC 34.6 33.6 - 35.0 g/dL    RDW 43.3 35.9 - 50.0 fL    Platelet Count 276 164 - 446 K/uL    MPV 9.7 9.0 - 12.9 fL    Neutrophils-Polys 89.90 (H) 44.00 - 72.00 %    Lymphocytes 5.80 (L) 22.00 - 41.00 %    Monocytes 3.70 0.00 - 13.40 %    Eosinophils 0.00 0.00 - 6.90 %    Basophils 0.10 0.00 - 1.80 %    Immature Granulocytes 0.50 0.00 - 0.90 %    Nucleated RBC 0.00 /100 WBC    Neutrophils (Absolute) 13.17 (H) 2.00 - 7.15 K/uL    Lymphs (Absolute)  0.85 (L) 1.00 - 4.80 K/uL    Monos (Absolute) 0.54 0.00 - 0.85 K/uL    Eos (Absolute) 0.00 0.00 - 0.51 K/uL    Baso (Absolute) 0.01 0.00 - 0.12 K/uL    Immature Granulocytes (abs) 0.08 0.00 - 0.11 K/uL    NRBC (Absolute) 0.00 K/uL   LIPASE    Collection Time: 07/23/19 11:22 AM   Result Value Ref Range    Lipase 13 11 - 82 U/L   Comp Metabolic Panel    Collection Time: 07/23/19 11:22 AM   Result Value Ref Range    Sodium 135 135 - 145 mmol/L    Potassium 4.4 3.6 - 5.5 mmol/L    Chloride 104 96 - 112 mmol/L    Co2 22 20 - 33 mmol/L    Anion Gap 9.0 0.0 - 11.9    Glucose 103 (H) 65 - 99 mg/dL    Bun 18 8 - 22 mg/dL    Creatinine 0.70 0.50 - 1.40 mg/dL    Calcium 9.5 8.5 - 10.5 mg/dL    AST(SGOT) 20 12 - 45 U/L    ALT(SGPT) 18 2 - 50 U/L    Alkaline Phosphatase 102 (H) 30 - 99 U/L    Total Bilirubin 0.4 0.1 - 1.5 mg/dL    Albumin 4.1 3.2 - 4.9 g/dL    Total Protein 6.8 6.0 - 8.2 g/dL    Globulin 2.7 1.9 - 3.5 g/dL    A-G Ratio 1.5 g/dL   ESTIMATED GFR    Collection Time: 07/23/19 11:22 AM   Result Value Ref Range    GFR If African American >60 >60 mL/min/1.73 m 2    GFR If Non African American >60 >60 mL/min/1.73 m 2   Prothrombin Time    Collection Time: 07/23/19 11:22 AM   Result Value Ref Range    PT 13.6 12.0 - 14.6 sec    INR 1.02 0.87 - 1.13   APTT    Collection Time: 07/23/19 11:22 AM   Result Value Ref Range    APTT 34.7 24.7 - 36.0 sec   URINALYSIS    Collection Time: 07/23/19 11:56 AM   Result Value Ref Range    Color Yellow     Character Clear     Specific Gravity <=1.005 <1.035    Ph 5.5 5.0 - 8.0    Glucose Negative Negative mg/dL    Ketones Negative Negative mg/dL    Protein Negative Negative mg/dL    Bilirubin Negative Negative    Urobilinogen, Urine 0.2 Negative    Nitrite Negative Negative    Leukocyte Esterase Negative Negative    Occult Blood Negative Negative    Micro Urine Req see below    TROPONIN    Collection Time: 07/23/19  2:07 PM   Result Value Ref Range    Troponin T 18 6 - 19 ng/L   LACTIC  ACID    Collection Time: 07/23/19  2:07 PM   Result Value Ref Range    Lactic Acid 1.2 0.5 - 2.0 mmol/L   MAGNESIUM    Collection Time: 07/23/19  2:07 PM   Result Value Ref Range    Magnesium 2.3 1.5 - 2.5 mg/dL       Imaging/Procedures Review:    Independant Imaging Review: Completed  US-RUQ   Final Result      Large hepatic cyst. Smaller adjacent hepatic cyst is also seen.      No evidence of gallstones or biliary ductal dilatation.      Limited evaluation of the pancreatic body and tail.      Atherosclerotic plaque.            CT-FOREIGN FILM CAT SCAN   Final Result             Records reviewed and summarized in current documentation :  Yes  [employer] teaching service handout given to patient:  No         Assessment/Plan     Hypertensive urgency   Assessment & Plan    Patient has had blood pressure 170s to 180s for 2 months not responding well hydralazine and losartan.  Her daily blood pressure home log shows wide fluctuations in blood pressure.  This morning her blood pressure was 192/94.  She does not have any evidence of endorgan dominant as evidenced by negative troponin, normal creatinine, normal mentation.  Slow reduction in blood pressure over days preferred in her.    -Start on losartan 75 mg daily  -Hydralazine 20 mg IV every 4 hourly as needed for SBP greater than 180, DBP greater than 110  -Monitor for any endorgan damage     Hepatic cyst   Assessment & Plan    Long-standing hepatic cyst, now possibly infected, as indicated by abdominal symptoms, high white count with left shift, alkaline phosphatase was mildly elevated.  Liver abscess is a possibility, though patient does not have any fever or chills.  Differentials include hydatid cyst, Hepatic mucinous cystic neoplasm       -Admit to floor  -Start patient on ceftriaxone and metronidazole  -Tylenol 650 mg PRN for pain control  -IR Guided aspiration of hepatic cyst scheduled tomorrow, asked.  Aspirated fluid will be sent for microbiology and cytology               Anticipated Hospital stay:  >2 midnights        Quality Measures  Quality-Core Measures   Reviewed items::  EKG reviewed, Labs reviewed, Radiology images reviewed and Medications reviewed  Sanon catheter::  No Sanon  DVT prophylaxis pharmacological::  Not indicated at this time, ambulatory (Holding for procedure tomorrow)  DVT prophylaxis - mechanical:  SCDs  Ulcer Prophylaxis::  No  Antibiotics:  Treating active infection/contamination beyond 24 hours perioperative coverage    PCP: Gregory Munoz D.O.

## 2019-07-24 ENCOUNTER — APPOINTMENT (OUTPATIENT)
Dept: RADIOLOGY | Facility: MEDICAL CENTER | Age: 79
DRG: 443 | End: 2019-07-24
Attending: STUDENT IN AN ORGANIZED HEALTH CARE EDUCATION/TRAINING PROGRAM
Payer: MEDICARE

## 2019-07-24 PROBLEM — Z53.09 CONTRAINDICATION TO DEEP VEIN THROMBOSIS (DVT) PROPHYLAXIS: Status: ACTIVE | Noted: 2019-07-24

## 2019-07-24 PROBLEM — K75.0 HEPATIC ABSCESS: Status: ACTIVE | Noted: 2019-07-23

## 2019-07-24 LAB
ALBUMIN SERPL BCP-MCNC: 3.7 G/DL (ref 3.2–4.9)
ALBUMIN/GLOB SERPL: 1.4 G/DL
ALP SERPL-CCNC: 81 U/L (ref 30–99)
ALT SERPL-CCNC: 13 U/L (ref 2–50)
ANION GAP SERPL CALC-SCNC: 8 MMOL/L (ref 0–11.9)
AST SERPL-CCNC: 17 U/L (ref 12–45)
BASOPHILS # BLD AUTO: 0.3 % (ref 0–1.8)
BASOPHILS # BLD: 0.03 K/UL (ref 0–0.12)
BILIRUB SERPL-MCNC: 0.5 MG/DL (ref 0.1–1.5)
BUN SERPL-MCNC: 17 MG/DL (ref 8–22)
CALCIUM SERPL-MCNC: 9 MG/DL (ref 8.5–10.5)
CHLORIDE SERPL-SCNC: 106 MMOL/L (ref 96–112)
CHOLEST SERPL-MCNC: 134 MG/DL (ref 100–199)
CO2 SERPL-SCNC: 23 MMOL/L (ref 20–33)
CREAT SERPL-MCNC: 0.72 MG/DL (ref 0.5–1.4)
EOSINOPHIL # BLD AUTO: 0.02 K/UL (ref 0–0.51)
EOSINOPHIL NFR BLD: 0.2 % (ref 0–6.9)
ERYTHROCYTE [DISTWIDTH] IN BLOOD BY AUTOMATED COUNT: 45 FL (ref 35.9–50)
GLOBULIN SER CALC-MCNC: 2.7 G/DL (ref 1.9–3.5)
GLUCOSE BLD-MCNC: 92 MG/DL (ref 65–99)
GLUCOSE SERPL-MCNC: 108 MG/DL (ref 65–99)
GRAM STN SPEC: NORMAL
HCT VFR BLD AUTO: 41.3 % (ref 37–47)
HDLC SERPL-MCNC: 60 MG/DL
HGB BLD-MCNC: 13.8 G/DL (ref 12–16)
IMM GRANULOCYTES # BLD AUTO: 0.06 K/UL (ref 0–0.11)
IMM GRANULOCYTES NFR BLD AUTO: 0.6 % (ref 0–0.9)
LDLC SERPL CALC-MCNC: 54 MG/DL
LYMPHOCYTES # BLD AUTO: 2.03 K/UL (ref 1–4.8)
LYMPHOCYTES NFR BLD: 19.2 % (ref 22–41)
MCH RBC QN AUTO: 32.1 PG (ref 27–33)
MCHC RBC AUTO-ENTMCNC: 33.4 G/DL (ref 33.6–35)
MCV RBC AUTO: 96 FL (ref 81.4–97.8)
MONOCYTES # BLD AUTO: 0.89 K/UL (ref 0–0.85)
MONOCYTES NFR BLD AUTO: 8.4 % (ref 0–13.4)
NEUTROPHILS # BLD AUTO: 7.55 K/UL (ref 2–7.15)
NEUTROPHILS NFR BLD: 71.3 % (ref 44–72)
NRBC # BLD AUTO: 0 K/UL
NRBC BLD-RTO: 0 /100 WBC
PLATELET # BLD AUTO: 276 K/UL (ref 164–446)
PMV BLD AUTO: 9.8 FL (ref 9–12.9)
POTASSIUM SERPL-SCNC: 4 MMOL/L (ref 3.6–5.5)
PROT SERPL-MCNC: 6.4 G/DL (ref 6–8.2)
RBC # BLD AUTO: 4.3 M/UL (ref 4.2–5.4)
SIGNIFICANT IND 70042: NORMAL
SITE SITE: NORMAL
SODIUM SERPL-SCNC: 137 MMOL/L (ref 135–145)
SOURCE SOURCE: NORMAL
TRIGL SERPL-MCNC: 102 MG/DL (ref 0–149)
WBC # BLD AUTO: 10.6 K/UL (ref 4.8–10.8)

## 2019-07-24 PROCEDURE — 80053 COMPREHEN METABOLIC PANEL: CPT

## 2019-07-24 PROCEDURE — 700102 HCHG RX REV CODE 250 W/ 637 OVERRIDE(OP): Performed by: HOSPITALIST

## 2019-07-24 PROCEDURE — 88305 TISSUE EXAM BY PATHOLOGIST: CPT

## 2019-07-24 PROCEDURE — 82962 GLUCOSE BLOOD TEST: CPT

## 2019-07-24 PROCEDURE — 87205 SMEAR GRAM STAIN: CPT

## 2019-07-24 PROCEDURE — 700101 HCHG RX REV CODE 250: Performed by: INTERNAL MEDICINE

## 2019-07-24 PROCEDURE — 700111 HCHG RX REV CODE 636 W/ 250 OVERRIDE (IP): Performed by: INTERNAL MEDICINE

## 2019-07-24 PROCEDURE — A9270 NON-COVERED ITEM OR SERVICE: HCPCS | Performed by: HOSPITALIST

## 2019-07-24 PROCEDURE — 10160 PNXR ASPIR ABSC HMTMA BULLA: CPT

## 2019-07-24 PROCEDURE — 700111 HCHG RX REV CODE 636 W/ 250 OVERRIDE (IP): Performed by: RADIOLOGY

## 2019-07-24 PROCEDURE — 99232 SBSQ HOSP IP/OBS MODERATE 35: CPT | Mod: GC | Performed by: HOSPITALIST

## 2019-07-24 PROCEDURE — 36415 COLL VENOUS BLD VENIPUNCTURE: CPT

## 2019-07-24 PROCEDURE — 0F903ZX DRAINAGE OF LIVER, PERCUTANEOUS APPROACH, DIAGNOSTIC: ICD-10-PCS | Performed by: RADIOLOGY

## 2019-07-24 PROCEDURE — 700105 HCHG RX REV CODE 258: Performed by: INTERNAL MEDICINE

## 2019-07-24 PROCEDURE — 700102 HCHG RX REV CODE 250 W/ 637 OVERRIDE(OP): Performed by: INTERNAL MEDICINE

## 2019-07-24 PROCEDURE — 87070 CULTURE OTHR SPECIMN AEROBIC: CPT

## 2019-07-24 PROCEDURE — 88112 CYTOPATH CELL ENHANCE TECH: CPT

## 2019-07-24 PROCEDURE — A9270 NON-COVERED ITEM OR SERVICE: HCPCS | Performed by: STUDENT IN AN ORGANIZED HEALTH CARE EDUCATION/TRAINING PROGRAM

## 2019-07-24 PROCEDURE — A9270 NON-COVERED ITEM OR SERVICE: HCPCS | Performed by: INTERNAL MEDICINE

## 2019-07-24 PROCEDURE — 700111 HCHG RX REV CODE 636 W/ 250 OVERRIDE (IP): Performed by: STUDENT IN AN ORGANIZED HEALTH CARE EDUCATION/TRAINING PROGRAM

## 2019-07-24 PROCEDURE — 700102 HCHG RX REV CODE 250 W/ 637 OVERRIDE(OP): Performed by: STUDENT IN AN ORGANIZED HEALTH CARE EDUCATION/TRAINING PROGRAM

## 2019-07-24 PROCEDURE — 85025 COMPLETE CBC W/AUTO DIFF WBC: CPT

## 2019-07-24 PROCEDURE — 80061 LIPID PANEL: CPT

## 2019-07-24 PROCEDURE — 87075 CULTR BACTERIA EXCEPT BLOOD: CPT

## 2019-07-24 PROCEDURE — 770006 HCHG ROOM/CARE - MED/SURG/GYN SEMI*

## 2019-07-24 RX ORDER — SODIUM CHLORIDE 9 MG/ML
500 INJECTION, SOLUTION INTRAVENOUS
Status: ACTIVE | OUTPATIENT
Start: 2019-07-24 | End: 2019-07-24

## 2019-07-24 RX ORDER — METRONIDAZOLE 500 MG/1
500 TABLET ORAL EVERY 8 HOURS
Status: DISCONTINUED | OUTPATIENT
Start: 2019-07-24 | End: 2019-07-25

## 2019-07-24 RX ORDER — MIDAZOLAM HYDROCHLORIDE 1 MG/ML
.5-2 INJECTION INTRAMUSCULAR; INTRAVENOUS PRN
Status: ACTIVE | OUTPATIENT
Start: 2019-07-24 | End: 2019-07-24

## 2019-07-24 RX ORDER — MIDAZOLAM HYDROCHLORIDE 1 MG/ML
INJECTION INTRAMUSCULAR; INTRAVENOUS
Status: DISPENSED
Start: 2019-07-24 | End: 2019-07-25

## 2019-07-24 RX ORDER — ONDANSETRON 2 MG/ML
4 INJECTION INTRAMUSCULAR; INTRAVENOUS PRN
Status: ACTIVE | OUTPATIENT
Start: 2019-07-24 | End: 2019-07-24

## 2019-07-24 RX ADMIN — GABAPENTIN 100 MG: 100 CAPSULE ORAL at 05:09

## 2019-07-24 RX ADMIN — MIDAZOLAM HYDROCHLORIDE 0.5 MG: 1 INJECTION, SOLUTION INTRAMUSCULAR; INTRAVENOUS at 14:00

## 2019-07-24 RX ADMIN — ACETAMINOPHEN 650 MG: 325 TABLET, FILM COATED ORAL at 05:09

## 2019-07-24 RX ADMIN — TRAMADOL HYDROCHLORIDE 50 MG: 50 TABLET, FILM COATED ORAL at 21:26

## 2019-07-24 RX ADMIN — CEFTRIAXONE SODIUM 1 G: 1 INJECTION, POWDER, FOR SOLUTION INTRAMUSCULAR; INTRAVENOUS at 05:20

## 2019-07-24 RX ADMIN — METRONIDAZOLE 500 MG: 500 TABLET, FILM COATED ORAL at 15:41

## 2019-07-24 RX ADMIN — ACETAMINOPHEN 650 MG: 325 TABLET, FILM COATED ORAL at 21:26

## 2019-07-24 RX ADMIN — METRONIDAZOLE 500 MG: 500 INJECTION, SOLUTION INTRAVENOUS at 05:53

## 2019-07-24 RX ADMIN — ENOXAPARIN SODIUM 40 MG: 100 INJECTION SUBCUTANEOUS at 17:19

## 2019-07-24 RX ADMIN — FENTANYL CITRATE 25 MCG: 0.05 INJECTION, SOLUTION INTRAMUSCULAR; INTRAVENOUS at 14:05

## 2019-07-24 RX ADMIN — ACETAMINOPHEN 650 MG: 325 TABLET, FILM COATED ORAL at 17:19

## 2019-07-24 RX ADMIN — ACETAMINOPHEN 650 MG: 325 TABLET, FILM COATED ORAL at 11:44

## 2019-07-24 RX ADMIN — LOSARTAN POTASSIUM 75 MG: 25 TABLET ORAL at 05:09

## 2019-07-24 RX ADMIN — FENTANYL CITRATE 25 MCG: 0.05 INJECTION, SOLUTION INTRAMUSCULAR; INTRAVENOUS at 14:00

## 2019-07-24 RX ADMIN — METRONIDAZOLE 500 MG: 500 TABLET, FILM COATED ORAL at 21:26

## 2019-07-24 RX ADMIN — HYDRALAZINE HYDROCHLORIDE 25 MG: 50 TABLET, FILM COATED ORAL at 15:41

## 2019-07-24 RX ADMIN — GABAPENTIN 300 MG: 300 CAPSULE ORAL at 21:26

## 2019-07-24 ASSESSMENT — ENCOUNTER SYMPTOMS
DEPRESSION: 0
COUGH: 0
HEADACHES: 0
SORE THROAT: 0
DIARRHEA: 0
EYE PAIN: 0
BLOOD IN STOOL: 0
CONSTIPATION: 0
HEMOPTYSIS: 0
SHORTNESS OF BREATH: 1
FEVER: 0
DIAPHORESIS: 1
ABDOMINAL PAIN: 1
MYALGIAS: 0
PALPITATIONS: 0
FALLS: 0
TREMORS: 0
CHILLS: 0
DOUBLE VISION: 0
TINGLING: 0
BLURRED VISION: 1
FLANK PAIN: 0

## 2019-07-24 NOTE — DISCHARGE PLANNING
Pt independent prior to admission. She attends exercise classes M-F at the [facility] in [place]. She does have a cane she uses prn. Pt states she does not have Rx benefits but received her last Rx for $4.99 each with 'some card'. Her frien Dylan will pick her up when discharged.    Facesheet updated with emergency contacts.    Care Transition Team Assessment    Information Source  Orientation : Oriented x 4  Information Given By: Patient  Who is responsible for making decisions for patient? : Patient         Elopement Risk  Legal Hold: No    Interdisciplinary Discharge Planning  Does Admitting Nurse Feel This Could be a Complex Discharge?: No  Primary Care Physician: Gregory Munoz DO  Lives with - Patient's Self Care Capacity: Alone and Able to Care For Self  Support Systems: Friends / Neighbors  Do You Take your Prescribed Medications Regularly: No (not on any)  Able to Return to Previous ADL's: Yes  Mobility Issues: No  Prior Services: None  Patient Expects to be Discharged to:: home  Assistance Needed: Unknown at this Time  Durable Medical Equipment: Not Applicable    Discharge Preparedness  What is your plan after discharge?: Home with help  Prior Functional Level: Independent with Activities of Daily Living  Difficulity with ADLs: None  Difficulity with IADLs: None    Functional Assesment  Prior Functional Level: Independent with Activities of Daily Living    Finances  Financial Barriers to Discharge: No  Prescription Coverage: Yes              Advance Directive  Advance Directive?: Living Will    Domestic Abuse  Have you ever been the victim of abuse or violence?: No         Discharge Risks or Barriers  Discharge risks or barriers?: No    Anticipated Discharge Information  Anticipated discharge disposition: Home  Discharge Address: [address] [place] [address]  Discharge Contact Phone Number: [phone]

## 2019-07-24 NOTE — OR SURGEON
Immediate Post- Operative Note        PostOp Diagnosis: Liver cyst      Procedure(s): USG guided drainage of fluid collection.    600 ml of Serous fluid was aspirated.No drain was placed.      Estimated Blood Loss: Less than 5 ml        Complications: None            7/24/2019     2:27 PM     Harsha Mina

## 2019-07-24 NOTE — CARE PLAN
Problem: Knowledge Deficit  Goal: Knowledge of disease process/condition, treatment plan, diagnostic tests, and medications will improve  Outcome: PROGRESSING AS EXPECTED  Explained NPO diet and the procedure scheduled tomorrow. Pt verbalized understanding.     Problem: Pain Management  Goal: Pain level will decrease to patient's comfort goal  Outcome: PROGRESSING AS EXPECTED  PRN tramadol and scheduled tylenol given.

## 2019-07-24 NOTE — PROGRESS NOTES
Patient to IR2.  Pt consented by Dr. Mina.  All questions regarding procedure and moderate sedation answered.  Oxygen and monitors applied.  Patient remained on bed for procedure.  Ultrasound guided drainage of fluid collection completed.  680 ml clear serous fluid removed.  30ml sent to lab for analysis.  Patient tolerated well.  4x4 and tegaderm applied to puncture wound.  Patient transported back to room and report given to KAVEH Ramos.

## 2019-07-24 NOTE — PROGRESS NOTES
2 RN skin check complete with RNNohemy.   Devices in place: PIV.  Skin assessed under devices yes.  Confirmed pressure ulcers found on NA.  New potential pressure ulcers noted on NA. Wound consult placed : NA.  The following skin issues noted: redness noted on face, red and blanching. Heels are upper sole of feet are red and blanching, pillows used for elevating the feet. Bruising noted on bilateral hands, otherwise skin is intact. Spider veins noted on bilateral legs.

## 2019-07-24 NOTE — PROGRESS NOTES
Internal Medicine Interval Note  Note Author: Miko Stafford M.D.     Name Catherine Huitron 1940   Age/Sex 79 y.o. female   MRN [id number]   Code Status Full     After 5PM or if no immediate response to page, please call for cross-coverage  Attending/Team: Donato Madsen R. See Patient List for primary contact information  Call [phone] to page    1st Call - Day Intern (R1):   BORIS Arevalo 2nd Call - Day Sr. Resident (R2/R3):   CATHERINE Stafford         Reason for interval visit  (Principal Problem)   Hepatic abscess    ID:  78 yo female with a history of hypertension and liver cyst first ID .  She presented for abdominal pain several days after evaluation at [facility] with CT showing 12 cm mass.  She has leukocytosis and she is being treated for suspected liver abscess with C3 metro and liver drainage.    Interval Problem Daily Status Update  (24 hours)   -Overnight.  No events  -Hepatic cyst/abscess:  Pain well controlled last night.  IR drainage scheduled for 3pm.  Still on C3 metro.  -HTN:  BP responding well to increased losartan.  SBP now in the 120s-130s.  She received on dose of PRN PO hydralazine after .  -DC plan:  Pending liver cyst fluid analysis.    Review of Systems   Constitutional: Positive for diaphoresis. Negative for chills and fever.        22lb weight gain in the last 3-4 months   HENT: Negative for hearing loss and sore throat.    Eyes: Positive for blurred vision (chronic intermittent blurred vision for 2 months). Negative for double vision and pain.   Respiratory: Positive for shortness of breath. Negative for cough and hemoptysis.    Cardiovascular: Negative for chest pain and palpitations.   Gastrointestinal: Positive for abdominal pain. Negative for blood in stool, constipation and diarrhea.   Genitourinary: Negative for dysuria and flank pain.   Musculoskeletal: Negative for falls and myalgias.   Skin: Negative for itching and rash.   Neurological: Negative  for tingling, tremors and headaches.   Psychiatric/Behavioral: Negative for depression and suicidal ideas.       Consultants/Specialty  IR liver abscess drainage.  PCP: @PCP    Disposition  In patient.    Quality Measures  Quality-Core Measures   Reviewed items::  EKG reviewed, Labs reviewed, Medications reviewed and Radiology images reviewed  DVT prophylaxis - mechanical:  SCDs          Physical Exam       Vitals:    07/24/19 1405 07/24/19 1410 07/24/19 1415 07/24/19 1420   BP: 158/71 157/71 155/71 (!) 163/72   Pulse: (!) 58 61 61 (!) 57   Resp: 17 16 17 16   Temp:       TempSrc:       SpO2: 98% 98% 98% 98%   Weight:       Height:         Body mass index is 26.83 kg/m². Weight: 68.7 kg (151 lb 7.3 oz)  Oxygen Therapy:  Pulse Oximetry: 98 %, O2 (LPM): 2, O2 Delivery: Nasal Cannula    Physical Exam   Constitutional: She is oriented to person, place, and time and well-developed, well-nourished, and in no distress. No distress.   HENT:   Head: Normocephalic and atraumatic.   Mallampati 3.   Eyes: Pupils are equal, round, and reactive to light. Conjunctivae and EOM are normal.   Neck: Normal range of motion. Neck supple. No JVD present.   Cardiovascular: Normal rate, regular rhythm and normal heart sounds.  Exam reveals no friction rub.    No murmur heard.  Pulmonary/Chest: Effort normal and breath sounds normal. No respiratory distress. She has no wheezes.   Abdominal: Soft. Bowel sounds are normal. There is tenderness.   Musculoskeletal: Normal range of motion. She exhibits edema.   Neurological: She is alert and oriented to person, place, and time.   Skin: Skin is warm and dry. She is not diaphoretic.   Psychiatric: Affect and judgment normal.     Lab Data Review:     7/24/2019  8:27 AM    Recent Labs      07/23/19   1122  07/23/19   1407  07/24/19   0345   SODIUM  135   --   137   POTASSIUM  4.4   --   4.0   CHLORIDE  104   --   106   CO2  22   --   23   BUN  18   --   17   CREATININE  0.70   --   0.72   MAGNESIUM    --   2.3   --    CALCIUM  9.5   --   9.0       Recent Labs      07/23/19   1122  07/23/19   1130  07/24/19   0345   ALTSGPT  18   --   13   ASTSGOT  20   --   17   ALKPHOSPHAT  102*   --   81   TBILIRUBIN  0.4   --   0.5   GAMMAGT   --   15   --    LIPASE  13   --    --    GLUCOSE  103*   --   108*       Recent Labs      07/23/19   1122  07/24/19   0345   RBC  4.29  4.30   HEMOGLOBIN  14.1  13.8   HEMATOCRIT  40.8  41.3   PLATELETCT  276  276   PROTHROMBTM  13.6   --    APTT  34.7   --    INR  1.02   --        Recent Labs      07/23/19   1122  07/24/19   0345   WBC  14.7*  10.6   NEUTSPOLYS  89.90*  71.30   LYMPHOCYTES  5.80*  19.20*   MONOCYTES  3.70  8.40   EOSINOPHILS  0.00  0.20   BASOPHILS  0.10  0.30   ASTSGOT  20  17   ALTSGPT  18  13   ALKPHOSPHAT  102*  81   TBILIRUBIN  0.4  0.5           Assessment/Plan     * Hepatic abscess   Assessment & Plan    -Symptom:  Upper abdominal pain.  No fever or chills.  -On admission:  WBC 14.7 ANC 13.  -Cyst first ID 2014, but did not warrant intervention at that time.    -Differentials include hydatid cyst, Hepatic mucinous cystic neoplasm   -IR liver drainage 7/24.  -PLAN:  Cont C3 metro.  Tylenol 650 mg q6h for pain control, tramadol 50mg q8h prn.  Pending aspirated fluid analysis.     Essential (primary) hypertension   Assessment & Plan    -SBP range on floor now 130s to 150s.  -On admission had hypertensive urgency  -Home SBP 170s to 180s for 2 months despite PRN hydralazine and losartan.  -In ED:  SBP in the 170 to 180.  She required IV hydralazine for control  -No evidence of endorgan dominant as evidenced by negative troponin, normal creatinine, normal mentation.  -Follows with Dr. Layton, cardiology and received home antihypertensive regiment of scheduled losartan and PRN hydralazine.  -PLAN:  Increased home losartan 75 mg daily.  Cont PRN hydralazine. Cont monitor for any endorgan damage     Contraindication to deep vein thrombosis (DVT) prophylaxis   Assessment &  Plan    -Held lovenox on admission and provided SCD  -PLAN:  Restart lovenox after IR liver drainage.

## 2019-07-24 NOTE — H&P
Internal Medicine Admitting History and Physical    Note Author: Sveta Arevalo M.D.       Name Catherine Huitron 1940   Age/Sex 79 y.o. female   MRN [id number]   Code Status FULL     After 5PM or if no immediate response to page, please call for cross-coverage  Attending/Team: Dr. Sewell/KAYLEE See Patient List for primary contact information  Call [phone] to page    1st Call - Day Intern (R1):   Dr. Arevalo 2nd Call - Day Sr. Resident (R2/R3):   Dr. Stafford       Chief Complaint:   Epigastric pressure  Hypertension    HPI:  Patient is a 79-year-old female with past medical history of chronic venous insufficiency, hypertension,Hepatic cyst diagnosed in , who presented with epigastric pressure and malignant hypertension.  Patient was recently diagnosed with malignant hypertension, and underwent an echocardiogram last Thursday following which she developed abdominal pain, abdominal pressure, abdominal distention, shortness of breath and lightheadedness.  On Friday she went to [facility] for her symptoms.  She was discharged on pain medication-gabapentin, tramadol and stool softeners which brought down the pain and abdominal distention.  By  morning the abdominal pressure began to increase again location.  This morning patient felt overly anxious, her heart rate went up to 102 and her blood pressure was 192/94.  Patient says she has abdominal pressure in the epigastric region, with shooting pains.  There is no association of pain with position or eating, no nausea or vomiting, diarrhea or constipation.    Patient was diagnosed with a hepatic cyst in [facility] in  when she had abdominal pain.  She was told the cyst is benign and did not need any active intervention.  Since then she has had mild shooting pains periodically, not requiring any medications.    Of note, patient has been having high blood pressure with systolic blood pressure in the 170s and 180s in the last 2 months.   She also complains of intermittent blurriness of the vision for the last 2 months.  Patient says she has unintentionally gained 22 pounds in the last 3 to 4 months. No history of chest pain, headache, palpitations.  In the ED, patient was found to have an elevated white count of 14.7 with absolute neutrophil count of 13.17.Ultrasound right upper quadrant revealed a large hepatic cyst in the left lobe, and an adjacent smaller cyst.  Outside CT abdomen on July 19, 2019 shows concurrent findings, with calcified granuloma in the right lower lobe of lung and a small calcified granuloma in the spleen.  Outside lab report on July 18, 2019 showed a white count of 10.8 with ANC of 7.7.    Review of Systems   Constitutional: Positive for diaphoresis. Negative for chills and fever.        22lbs weight gain in last 3-4 months   HENT: Negative for ear pain, hearing loss and tinnitus.    Eyes: Positive for blurred vision (Intermittent blurry vision for 2 months). Negative for double vision, photophobia, pain and discharge.   Respiratory: Positive for shortness of breath. Negative for cough, hemoptysis and sputum production.    Cardiovascular: Positive for leg swelling. Negative for chest pain, palpitations and orthopnea.   Gastrointestinal: Positive for abdominal pain. Negative for constipation, diarrhea, heartburn, nausea and vomiting.   Genitourinary: Negative for dysuria, frequency and urgency.   Musculoskeletal: Negative for back pain, myalgias and neck pain.   Neurological: Positive for dizziness (lightheadedness). Negative for tingling, tremors, weakness and headaches.   Endo/Heme/Allergies: Does not bruise/bleed easily.   Psychiatric/Behavioral: Negative for depression, substance abuse and suicidal ideas.       Past Medical History (Chronic medical problem, known complications and current treatment)    Past Medical History:   Diagnosis Date   • Chronic venous insufficiency 2018   • Hypertension          Past Surgical  "History:  Past Surgical History:   Procedure Laterality Date   • SCLEROTHERAPHY Right 10/2018       Current Outpatient Medications:  Home Medications     Reviewed by Jalyn Beckham (Pharmacy Tech) on 07/23/19 at 1418  Med List Status: Complete   Medication Last Dose Status   acetaminophen (TYLENOL) 325 MG Tab 7/23/2019 Active   docusate sodium (COLACE) 100 MG Cap 7/23/2019 Active   gabapentin (NEURONTIN) 100 MG Cap 7/23/2019 Active   gabapentin (NEURONTIN) 300 MG Cap 7/22/2019 Active   hydrALAZINE (APRESOLINE) 25 MG Tab 7/22/2019 Active   losartan (COZAAR) 50 MG Tab 7/23/2019 Active   tramadol (ULTRAM) 50 MG Tab 7/23/2019 Active                Medication Allergy/Sensitivities:  Allergies   Allergen Reactions   • Antibiotic [Neomycin-Polymyxin B]      Can't take any antibiotics   • Lisinopril      \"puffy\" and cough.  Possible angioedema         Family History (mandatory)   Family History   Problem Relation Age of Onset   • Cancer Father        Social History (mandatory)   Social History     Social History   • Marital status:      Spouse name: N/A   • Number of children: N/A   • Years of education: N/A     Occupational History   • Not on file.     Social History Main Topics   • Smoking status: Never Smoker   • Smokeless tobacco: Never Used   • Alcohol use No   • Drug use: No   • Sexual activity: Not on file     Other Topics Concern   • Not on file     Social History Narrative   • No narrative on file     Living situation: Lives by herself in [place]  PCP : Gregory Munoz D.O.    Physical Exam     Vitals:    07/23/19 1130 07/23/19 1205 07/23/19 1335 07/23/19 1430   BP: (!) 170/70 (!) 171/66 158/73 (!) 168/70   Pulse: 72 68 67 81   Resp: 15 16 16 16   Temp:       TempSrc:       SpO2:  98% 98%    Weight:       Height:         Body mass index is 27.14 kg/m².  O2 therapy: Pulse Oximetry: 98 %, O2 Delivery: None (Room Air)    Physical Exam   Constitutional: She is oriented to person, place, and time and " well-developed, well-nourished, and in no distress. No distress.   HENT:   Head: Normocephalic and atraumatic.   Mouth/Throat: Oropharynx is clear and moist.   Mallampati 3   Eyes: Pupils are equal, round, and reactive to light. Conjunctivae and EOM are normal. No scleral icterus.   Neck: Normal range of motion. Neck supple. No JVD present. No tracheal deviation present. No thyromegaly present.   Cardiovascular: Normal rate, regular rhythm and normal heart sounds.    Right dorsalis pedis not palpable   Pulmonary/Chest: Effort normal and breath sounds normal. No respiratory distress. She has no wheezes. She has no rales.   Abdominal: Soft. Bowel sounds are normal. She exhibits no distension. There is no tenderness. There is no rebound.   Musculoskeletal: Normal range of motion. She exhibits edema (3+ pitting edema B/l upto calves).   Neurological: She is alert and oriented to person, place, and time. No cranial nerve deficit.   Skin: Skin is warm and dry. No rash noted. She is not diaphoretic. No erythema.   Psychiatric: Mood and affect normal.         Data Review       Old Records Request:   Completed  Current Records review/summary: Completed    Lab Data Review:  Recent Results (from the past 24 hour(s))   CBC WITH DIFFERENTIAL    Collection Time: 07/23/19 11:22 AM   Result Value Ref Range    WBC 14.7 (H) 4.8 - 10.8 K/uL    RBC 4.29 4.20 - 5.40 M/uL    Hemoglobin 14.1 12.0 - 16.0 g/dL    Hematocrit 40.8 37.0 - 47.0 %    MCV 95.1 81.4 - 97.8 fL    MCH 32.9 27.0 - 33.0 pg    MCHC 34.6 33.6 - 35.0 g/dL    RDW 43.3 35.9 - 50.0 fL    Platelet Count 276 164 - 446 K/uL    MPV 9.7 9.0 - 12.9 fL    Neutrophils-Polys 89.90 (H) 44.00 - 72.00 %    Lymphocytes 5.80 (L) 22.00 - 41.00 %    Monocytes 3.70 0.00 - 13.40 %    Eosinophils 0.00 0.00 - 6.90 %    Basophils 0.10 0.00 - 1.80 %    Immature Granulocytes 0.50 0.00 - 0.90 %    Nucleated RBC 0.00 /100 WBC    Neutrophils (Absolute) 13.17 (H) 2.00 - 7.15 K/uL    Lymphs (Absolute)  0.85 (L) 1.00 - 4.80 K/uL    Monos (Absolute) 0.54 0.00 - 0.85 K/uL    Eos (Absolute) 0.00 0.00 - 0.51 K/uL    Baso (Absolute) 0.01 0.00 - 0.12 K/uL    Immature Granulocytes (abs) 0.08 0.00 - 0.11 K/uL    NRBC (Absolute) 0.00 K/uL   LIPASE    Collection Time: 07/23/19 11:22 AM   Result Value Ref Range    Lipase 13 11 - 82 U/L   Comp Metabolic Panel    Collection Time: 07/23/19 11:22 AM   Result Value Ref Range    Sodium 135 135 - 145 mmol/L    Potassium 4.4 3.6 - 5.5 mmol/L    Chloride 104 96 - 112 mmol/L    Co2 22 20 - 33 mmol/L    Anion Gap 9.0 0.0 - 11.9    Glucose 103 (H) 65 - 99 mg/dL    Bun 18 8 - 22 mg/dL    Creatinine 0.70 0.50 - 1.40 mg/dL    Calcium 9.5 8.5 - 10.5 mg/dL    AST(SGOT) 20 12 - 45 U/L    ALT(SGPT) 18 2 - 50 U/L    Alkaline Phosphatase 102 (H) 30 - 99 U/L    Total Bilirubin 0.4 0.1 - 1.5 mg/dL    Albumin 4.1 3.2 - 4.9 g/dL    Total Protein 6.8 6.0 - 8.2 g/dL    Globulin 2.7 1.9 - 3.5 g/dL    A-G Ratio 1.5 g/dL   ESTIMATED GFR    Collection Time: 07/23/19 11:22 AM   Result Value Ref Range    GFR If African American >60 >60 mL/min/1.73 m 2    GFR If Non African American >60 >60 mL/min/1.73 m 2   Prothrombin Time    Collection Time: 07/23/19 11:22 AM   Result Value Ref Range    PT 13.6 12.0 - 14.6 sec    INR 1.02 0.87 - 1.13   APTT    Collection Time: 07/23/19 11:22 AM   Result Value Ref Range    APTT 34.7 24.7 - 36.0 sec   URINALYSIS    Collection Time: 07/23/19 11:56 AM   Result Value Ref Range    Color Yellow     Character Clear     Specific Gravity <=1.005 <1.035    Ph 5.5 5.0 - 8.0    Glucose Negative Negative mg/dL    Ketones Negative Negative mg/dL    Protein Negative Negative mg/dL    Bilirubin Negative Negative    Urobilinogen, Urine 0.2 Negative    Nitrite Negative Negative    Leukocyte Esterase Negative Negative    Occult Blood Negative Negative    Micro Urine Req see below    TROPONIN    Collection Time: 07/23/19  2:07 PM   Result Value Ref Range    Troponin T 18 6 - 19 ng/L   LACTIC  ACID    Collection Time: 07/23/19  2:07 PM   Result Value Ref Range    Lactic Acid 1.2 0.5 - 2.0 mmol/L   MAGNESIUM    Collection Time: 07/23/19  2:07 PM   Result Value Ref Range    Magnesium 2.3 1.5 - 2.5 mg/dL       Imaging/Procedures Review:    Independant Imaging Review: Completed  US-RUQ   Final Result      Large hepatic cyst. Smaller adjacent hepatic cyst is also seen.      No evidence of gallstones or biliary ductal dilatation.      Limited evaluation of the pancreatic body and tail.      Atherosclerotic plaque.            CT-FOREIGN FILM CAT SCAN   Final Result             Records reviewed and summarized in current documentation :  Yes  [employer] teaching service handout given to patient:  No         Assessment/Plan     Hypertensive urgency   Assessment & Plan    Patient has had blood pressure 170s to 180s for 2 months not responding well hydralazine and losartan.  Her daily blood pressure home log shows wide fluctuations in blood pressure.  This morning her blood pressure was 192/94.  She does not have any evidence of endorgan dominant as evidenced by negative troponin, normal creatinine, normal mentation.  Slow reduction in blood pressure over days preferred in her.    -Start on losartan 75 mg daily  -Hydralazine 20 mg IV every 4 hourly as needed for SBP greater than 180, DBP greater than 110  -Monitor for any endorgan damage     Hepatic cyst   Assessment & Plan    Long-standing hepatic cyst, now possibly infected, as indicated by abdominal symptoms, high white count with left shift, alkaline phosphatase was mildly elevated.  Liver abscess is a possibility, though patient does not have any fever or chills.  Differentials include hydatid cyst, Hepatic mucinous cystic neoplasm       -Admit to floor  -Start patient on ceftriaxone and metronidazole  -Tylenol 650 mg PRN for pain control  -IR Guided aspiration of hepatic cyst scheduled tomorrow, asked.  Aspirated fluid will be sent for microbiology and cytology          Anticipated Hospital stay:  >2 midnights        Quality Measures  Quality-Core Measures   Reviewed items::  EKG reviewed, Labs reviewed, Radiology images reviewed and Medications reviewed  Sanon catheter::  No Sanon  DVT prophylaxis pharmacological::  Not indicated at this time, ambulatory (Holding for procedure tomorrow)  DVT prophylaxis - mechanical:  SCDs  Ulcer Prophylaxis::  No  Antibiotics:  Treating active infection/contamination beyond 24 hours perioperative coverage    PCP: Gregory Munoz D.O.

## 2019-07-24 NOTE — PROGRESS NOTES
Pt arrived at floor with transport. ABIDA/Ox4, discussed plan of care. Pt on room air. Pt brought home meds with her. 7 bottle of home meds sent to pharmacy and strip is in her chart. Pt BP was 174/89. PRN Hydralazine given. BP rechecked. 109/59. complains right upper ABD pain. PRN Tramadol given and scheduled Tylenol given. Admission record completed. Pt refused flu vaccine and pneumonia vaccine despite education. TV dinner provided. Snacks and drinks provided. Obtained salad and fresh fruit from kitchen around 22:20. Pt stated that she didn't want to be waken up for lab before 04:00am. Notified lab to draw lab after 04:00.  Pt ate and went to sleep.   All needs met at this time. Bed in lowest position, treaded socks on, personal belongings and call light within reach, instructed to call for any assistance, hourly rounding in place.

## 2019-07-25 VITALS
BODY MASS INDEX: 26.84 KG/M2 | WEIGHT: 151.46 LBS | RESPIRATION RATE: 16 BRPM | TEMPERATURE: 97.9 F | SYSTOLIC BLOOD PRESSURE: 117 MMHG | HEART RATE: 73 BPM | HEIGHT: 63 IN | OXYGEN SATURATION: 92 % | DIASTOLIC BLOOD PRESSURE: 63 MMHG

## 2019-07-25 LAB
ALBUMIN SERPL BCP-MCNC: 3.4 G/DL (ref 3.2–4.9)
ALBUMIN/GLOB SERPL: 1.3 G/DL
ALP SERPL-CCNC: 76 U/L (ref 30–99)
ALT SERPL-CCNC: 12 U/L (ref 2–50)
ANION GAP SERPL CALC-SCNC: 7 MMOL/L (ref 0–11.9)
AST SERPL-CCNC: 26 U/L (ref 12–45)
BASOPHILS # BLD AUTO: 1.2 % (ref 0–1.8)
BASOPHILS # BLD: 0.08 K/UL (ref 0–0.12)
BILIRUB SERPL-MCNC: 0.4 MG/DL (ref 0.1–1.5)
BUN SERPL-MCNC: 17 MG/DL (ref 8–22)
CALCIUM SERPL-MCNC: 8.8 MG/DL (ref 8.5–10.5)
CHLORIDE SERPL-SCNC: 109 MMOL/L (ref 96–112)
CO2 SERPL-SCNC: 22 MMOL/L (ref 20–33)
CREAT SERPL-MCNC: 0.69 MG/DL (ref 0.5–1.4)
EOSINOPHIL # BLD AUTO: 0.17 K/UL (ref 0–0.51)
EOSINOPHIL NFR BLD: 2.6 % (ref 0–6.9)
ERYTHROCYTE [DISTWIDTH] IN BLOOD BY AUTOMATED COUNT: 46.6 FL (ref 35.9–50)
GLOBULIN SER CALC-MCNC: 2.6 G/DL (ref 1.9–3.5)
GLUCOSE SERPL-MCNC: 97 MG/DL (ref 65–99)
HCT VFR BLD AUTO: 43.7 % (ref 37–47)
HGB BLD-MCNC: 14.3 G/DL (ref 12–16)
IMM GRANULOCYTES # BLD AUTO: 0.05 K/UL (ref 0–0.11)
IMM GRANULOCYTES NFR BLD AUTO: 0.8 % (ref 0–0.9)
LYMPHOCYTES # BLD AUTO: 2.07 K/UL (ref 1–4.8)
LYMPHOCYTES NFR BLD: 31.3 % (ref 22–41)
MCH RBC QN AUTO: 32.1 PG (ref 27–33)
MCHC RBC AUTO-ENTMCNC: 32.7 G/DL (ref 33.6–35)
MCV RBC AUTO: 98 FL (ref 81.4–97.8)
MONOCYTES # BLD AUTO: 0.8 K/UL (ref 0–0.85)
MONOCYTES NFR BLD AUTO: 12.1 % (ref 0–13.4)
NEUTROPHILS # BLD AUTO: 3.45 K/UL (ref 2–7.15)
NEUTROPHILS NFR BLD: 52 % (ref 44–72)
NRBC # BLD AUTO: 0 K/UL
NRBC BLD-RTO: 0 /100 WBC
PLATELET # BLD AUTO: 283 K/UL (ref 164–446)
PMV BLD AUTO: 9.9 FL (ref 9–12.9)
POTASSIUM SERPL-SCNC: 4.6 MMOL/L (ref 3.6–5.5)
PROT SERPL-MCNC: 6 G/DL (ref 6–8.2)
RBC # BLD AUTO: 4.46 M/UL (ref 4.2–5.4)
SODIUM SERPL-SCNC: 138 MMOL/L (ref 135–145)
WBC # BLD AUTO: 6.6 K/UL (ref 4.8–10.8)

## 2019-07-25 PROCEDURE — 99239 HOSP IP/OBS DSCHRG MGMT >30: CPT | Mod: GC | Performed by: HOSPITALIST

## 2019-07-25 PROCEDURE — A9270 NON-COVERED ITEM OR SERVICE: HCPCS | Performed by: HOSPITALIST

## 2019-07-25 PROCEDURE — 36415 COLL VENOUS BLD VENIPUNCTURE: CPT

## 2019-07-25 PROCEDURE — 700111 HCHG RX REV CODE 636 W/ 250 OVERRIDE (IP): Performed by: INTERNAL MEDICINE

## 2019-07-25 PROCEDURE — 700102 HCHG RX REV CODE 250 W/ 637 OVERRIDE(OP): Performed by: INTERNAL MEDICINE

## 2019-07-25 PROCEDURE — A9270 NON-COVERED ITEM OR SERVICE: HCPCS | Performed by: INTERNAL MEDICINE

## 2019-07-25 PROCEDURE — 700111 HCHG RX REV CODE 636 W/ 250 OVERRIDE (IP): Performed by: STUDENT IN AN ORGANIZED HEALTH CARE EDUCATION/TRAINING PROGRAM

## 2019-07-25 PROCEDURE — 700102 HCHG RX REV CODE 250 W/ 637 OVERRIDE(OP): Performed by: STUDENT IN AN ORGANIZED HEALTH CARE EDUCATION/TRAINING PROGRAM

## 2019-07-25 PROCEDURE — 700105 HCHG RX REV CODE 258: Performed by: INTERNAL MEDICINE

## 2019-07-25 PROCEDURE — A9270 NON-COVERED ITEM OR SERVICE: HCPCS | Performed by: STUDENT IN AN ORGANIZED HEALTH CARE EDUCATION/TRAINING PROGRAM

## 2019-07-25 PROCEDURE — 85025 COMPLETE CBC W/AUTO DIFF WBC: CPT

## 2019-07-25 PROCEDURE — 700102 HCHG RX REV CODE 250 W/ 637 OVERRIDE(OP): Performed by: HOSPITALIST

## 2019-07-25 PROCEDURE — 80053 COMPREHEN METABOLIC PANEL: CPT

## 2019-07-25 RX ORDER — LOSARTAN POTASSIUM 50 MG/1
75 TABLET ORAL DAILY
Qty: 30 TAB | Refills: 0 | Status: SHIPPED | OUTPATIENT
Start: 2019-07-25 | End: 2019-08-27 | Stop reason: SDUPTHER

## 2019-07-25 RX ADMIN — ENOXAPARIN SODIUM 40 MG: 100 INJECTION SUBCUTANEOUS at 05:16

## 2019-07-25 RX ADMIN — METRONIDAZOLE 500 MG: 500 TABLET, FILM COATED ORAL at 05:18

## 2019-07-25 RX ADMIN — ACETAMINOPHEN 650 MG: 325 TABLET, FILM COATED ORAL at 08:48

## 2019-07-25 RX ADMIN — LOSARTAN POTASSIUM 75 MG: 25 TABLET ORAL at 04:48

## 2019-07-25 RX ADMIN — HYDRALAZINE HYDROCHLORIDE 25 MG: 50 TABLET, FILM COATED ORAL at 05:14

## 2019-07-25 RX ADMIN — CEFTRIAXONE SODIUM 1 G: 1 INJECTION, POWDER, FOR SOLUTION INTRAMUSCULAR; INTRAVENOUS at 05:32

## 2019-07-25 RX ADMIN — ACETAMINOPHEN 650 MG: 325 TABLET, FILM COATED ORAL at 04:48

## 2019-07-25 RX ADMIN — SENNOSIDES, DOCUSATE SODIUM 2 TABLET: 50; 8.6 TABLET, FILM COATED ORAL at 05:19

## 2019-07-25 RX ADMIN — GABAPENTIN 100 MG: 100 CAPSULE ORAL at 04:48

## 2019-07-25 ASSESSMENT — ENCOUNTER SYMPTOMS
DOUBLE VISION: 0
DIARRHEA: 0
CONSTIPATION: 0
COUGH: 0
FLANK PAIN: 0
HEADACHES: 0
HEMOPTYSIS: 0
SORE THROAT: 0
TREMORS: 0
PALPITATIONS: 0
ABDOMINAL PAIN: 1
MYALGIAS: 0
SHORTNESS OF BREATH: 1
CHILLS: 0
FEVER: 0
BLOOD IN STOOL: 0
BLURRED VISION: 1
EYE PAIN: 0
TINGLING: 0
DEPRESSION: 0
DIAPHORESIS: 1
FALLS: 0

## 2019-07-25 NOTE — PROGRESS NOTES
Internal Medicine Interval Note  Note Author: Miko Stafford M.D.     Name Catherine Huitron 1940   Age/Sex 79 y.o. female   MRN [id number]   Code Status FULL     After 5PM or if no immediate response to page, please call for cross-coverage  Attending/Team: Dr. Sewell/KAYLEE See Patient List for primary contact information  Call [phone] to page    1st Call - Day Intern (R1):   Dr. Arevalo 2nd Call - Day Sr. Resident (R2/R3):   Dr. Stafford         Reason for interval visit  (Principal Problem)   Hepatic mass      Interval Problem Daily Status Update  (24 hours, problem oriented, brief subjective history, new lab/imaging data pertinent to that problem)   -No acute overnight events.  -Patient underwent IR guided drainage of hepatic cyst .  680 mL of clear serous fluid was removed.  Gram stain of the fluid showed rare WBCs and no organisms. Culture pending  -Patient's blood pressure was elevated one time with a /83.  Othere pereira she has run in the 110s to 140s.  -Patient is on day 3 of ceftriaxone and metronidazole.  Her white count is down to 6.6 from 10.6.  ABX stopped with liver fluid showing no organisms.  Pharm ([facility]) agrees with no discharge on antibiotics.  -Patient advised for outpatient surgery referral for further evaluation of simple hepatic cyst    Review of Systems   Constitutional: Positive for diaphoresis (resolved). Negative for chills and fever.        22lb weight gain in the last 3-4 months   HENT: Negative for hearing loss and sore throat.    Eyes: Positive for blurred vision (chronic intermittent blurred vision for 2 months). Negative for double vision and pain.   Respiratory: Positive for shortness of breath (resolved). Negative for cough and hemoptysis.    Cardiovascular: Negative for chest pain and palpitations.   Gastrointestinal: Positive for abdominal pain (resolved). Negative for blood in stool, constipation and diarrhea.   Genitourinary: Negative for dysuria and  flank pain.   Musculoskeletal: Negative for falls and myalgias.   Skin: Negative for itching and rash.   Neurological: Negative for tingling, tremors and headaches.   Psychiatric/Behavioral: Negative for depression and suicidal ideas.       Disposition/Barriers to discharge:   None    Consultants/Specialty  None  PCP: Gregory Munoz D.O.      Quality Measures  Quality-Core Measures   Reviewed items::  Labs reviewed, Medications reviewed and Radiology images reviewed  Sanon catheter::  No Sanon  DVT prophylaxis pharmacological::  Enoxaparin (Lovenox)  DVT prophylaxis - mechanical:  SCDs  Ulcer Prophylaxis::  No          Physical Exam       Vitals:    07/24/19 1730 07/24/19 1910 07/25/19 0450 07/25/19 0800   BP: 142/68 129/60 (!) 186/83 117/63   Pulse: 70 69 66 73   Resp:  16 16 16   Temp:  36.6 °C (97.9 °F) 36.9 °C (98.4 °F) 36.6 °C (97.9 °F)   TempSrc:  Temporal Temporal Temporal   SpO2: 98% 93% 97% 92%   Weight:       Height:         Body mass index is 26.83 kg/m².    Oxygen Therapy:  Pulse Oximetry: 92 %, O2 (LPM): 0, O2 Delivery: None (Room Air)    Physical Exam   Constitutional: She is oriented to person, place, and time and well-developed, well-nourished, and in no distress. No distress.   HENT:   Head: Normocephalic and atraumatic.   Mallampati 3.   Eyes: Pupils are equal, round, and reactive to light. Conjunctivae and EOM are normal.   Neck: Normal range of motion. Neck supple. No JVD present.   Cardiovascular: Normal rate, regular rhythm and normal heart sounds.  Exam reveals no friction rub.    No murmur heard.  Pulmonary/Chest: Effort normal and breath sounds normal. No respiratory distress. She has no wheezes.   Abdominal: Soft. Bowel sounds are normal. She exhibits no distension. There is tenderness (Soreness in RUQ at site of IR drainage). There is no rebound.   Musculoskeletal: Normal range of motion. She exhibits edema (2+ pitting edema B/L).   Neurological: She is alert and oriented to person, place,  and time.   Skin: Skin is warm and dry. No rash noted. She is not diaphoretic. No erythema.   Psychiatric: Affect and judgment normal.             Assessment/Plan     * Simple hepatic cyst   Assessment & Plan    -Diagnosis of simple cyst given imaging showing no septations and initial fluid analysis with no organisms seen.    -Symptom:  Upper abdominal pain.  No fever or chills.  -On admission:  WBC 14.7 ANC 13. On discharge, WBC 6.6  -Cyst first ID [id number], but did not warrant intervention at that time.    -CT abdomen at [facility] showed a nonseptated 12cm hypoechoic liver mass  -[facility] at [facility] showed a large hepatic cyst with a smaller adjacent hepatic cyst.  No evidence of gallstones or biliary ductal dilatation.  -Differentials include hydatid cyst, Hepatic mucinous cystic neoplasm, but this is not supported by fluid analysis at this time.  -IR liver drainage 7/24- drained 680ml serous fluid showing no organisms, rare WBCs, culture report pending  -Discussed case with surgery.  If asymptomatic, no need for inpatient surgery consult.  She is ok for outpatient referral.  -Hospital course:  Started ceftriaxone and metronidazole on admission, given concern for liver abscess.  IR liver drainage 7/24.  Antibiotics stopped 7/25 after fluid analysis negative for organisim.  No antibiotics on discharge.  Discussed with pharmacy ([facility])  -Discharge plan:  Discharged 7/25.  She is scheduled to see primary care in the next week.  Pending studies include liver fluid culture and liver fluid cytology. The patient requested a referral to an appropriate surgeon that could perform a laproscopic cyst unroofing when she becomes symptomatic again.  Dr. Vinny Aquino is a general surgeon in [place] with a subspecialty in hepatobiliary surgery.  Referral by primary care to Dr. Aquino in 3-4 weeks would be appropriate.  If pending cytology and culture suggest an etiology other than simple cyst she would benefit from a referral to a  hepatologist such as Dr. Олег Pickett in [place] with [facility].  She was discharged with no antibiotics and no pain medications given resolution of pain.         Essential (primary) hypertension   Assessment & Plan    -SBP range on floor now 110s to 140s.  One time high of 186.  -On admission had hypertensive urgency  -Home SBP 170s to 180s for 2 months prior to admission despite PRN hydralazine and losartan.  -In ED:  SBP in the 170 to 180.  She required IV hydralazine for control  -No evidence of endorgan dominant as evidenced by negative troponin, normal creatinine, normal mentation.  -Follows with Dr. Layton, cardiology and received home antihypertensive regiment of scheduled losartan and PRN hydralazine.  -Hospital course:  Increased home losartan 75 mg daily.  Provided PRN hydralazine. She continued to have no evidence of end organ damage  -Discharge plan:  Patient to follow up with Dr. Layton as scheduled.  Discharging with losartan 75 mg daily and no change in PRN hydralazine dose. She was advised to continue monitoring her blood pressure.  She was instructed to return to her prior dose of losartan if she consistently had systolic blood pressures less than 110.     Contraindication to deep vein thrombosis (DVT) prophylaxis   Assessment & Plan    -Held lovenox on admission and provided SCD  -Started Lovenox after IR liver drainage.

## 2019-07-25 NOTE — DISCHARGE INSTRUCTIONS
Discharge Instructions    Discharged to home by car with friend. Discharged via walking, hospital escort: Yes.  Special equipment needed: Not Applicable    Be sure to schedule a follow-up appointment with your primary care doctor or any specialists as instructed.     Discharge Plan:   Diet Plan: Discussed  Activity Level: Discussed  Confirmed Follow up Appointment: Patient to Call and Schedule Appointment  Confirmed Symptoms Management: Discussed  Medication Reconciliation Updated: Yes  Pneumococcal Vaccine Administered/Refused: Given (See MAR)  Influenza Vaccine Indication: Patient Refuses    I understand that a diet low in cholesterol, fat, and sodium is recommended for good health. Unless I have been given specific instructions below for another diet, I accept this instruction as my diet prescription.   Other diet: Regular    Special Instructions: None    · Is patient discharged on Warfarin / Coumadin?   No     Depression / Suicide Risk    As you are discharged from this [facility] facility, it is important to learn how to keep safe from harming yourself.    Recognize the warning signs:  · Abrupt changes in personality, positive or negative- including increase in energy   · Giving away possessions  · Change in eating patterns- significant weight changes-  positive or negative  · Change in sleeping patterns- unable to sleep or sleeping all the time   · Unwillingness or inability to communicate  · Depression  · Unusual sadness, discouragement and loneliness  · Talk of wanting to die  · Neglect of personal appearance   · Rebelliousness- reckless behavior  · Withdrawal from people/activities they love  · Confusion- inability to concentrate     If you or a loved one observes any of these behaviors or has concerns about self-harm, here's what you can do:  · Talk about it- your feelings and reasons for harming yourself  · Remove any means that you might use to hurt yourself (examples: pills, rope, extension cords,  firearm)  · Get professional help from the community (Mental Health, Substance Abuse, psychological counseling)  · Do not be alone:Call your Safe Contact- someone whom you trust who will be there for you.  · Call your local CRISIS HOTLINE 064-0183 or 023-812-7916  · Call your local Children's Mobile Crisis Response Team [place] (626) 918-1993 or www.ChangePanda  · Call the toll free National Suicide Prevention Hotlines   · National Suicide Prevention Lifeline 427-067-QQON (0659)  · National Hope Line Network 800-SUICIDE (762-5680)

## 2019-07-25 NOTE — PROGRESS NOTES
Assumed care shift change. A/Ox4, discussed plan of care. Pt on room air.  BP is 129/60. complains right upper ABD pain. PRN Tramadol given and scheduled Tylenol given. Pt requested banana from kitchen around. Potassium is 4.0. Got banana from kitchen for pt.  Pt stated that she didn't want to be waken up for lab before 04:00am. Signed put at door.  Pt wanted to take out the IV. Explained the necessity of IV for IV antibiotics. Pt verbalized understanding.    All needs met at this time. Bed in lowest position, treaded socks on, personal belongings and call light within reach, instructed to call for any assistance, hourly rounding in place.

## 2019-07-25 NOTE — PROGRESS NOTES
Problem: Knowledge Deficit  Goal: Knowledge of disease process/condition, treatment plan, diagnostic tests, and medications will improve  Outcome: PROGRESSING AS EXPECTED  Explained necessity of IV for IV antibiotics. Pt verbalized understanding.      Problem: Pain Management  Goal: Pain level will decrease to patient's comfort goal  Outcome: PROGRESSING AS EXPECTED  PRN tramadol and scheduled tylenol given.

## 2019-07-25 NOTE — PROGRESS NOTES
Assumed care of Pt at shift change. Pt is A&Ox4. Pt denies pain and was given medication. Call light with in reach. Traction socks on pt and bed in lowest position.

## 2019-07-25 NOTE — CARE PLAN
Problem: Communication  Goal: The ability to communicate needs accurately and effectively will improve    Intervention: [place] patient and significant other/support system to call light to alert staff of needs  Pt educated on the use of the call light to get assistance from staff.       Problem: Bowel/Gastric:  Goal: Normal bowel function is maintained or improved  Pt encouraged to ambulate safely on order to maintain bowel motility.

## 2019-07-26 NOTE — DISCHARGE SUMMARY
Internal Medicine Discharge Summary  Note Author: Miko Stafford M.D.       Admit Date:  7/23/2019       Discharge Date:   7/25/2019    Service:   [facility] Internal Medicine Red Team  Attending Physician(s):   GIOVANA Sewell       Senior Resident(s):   CATHERINE Stafford  [place] Resident(s):   BORIS Arevalo  PCP: Gregory Munoz D.O.      Primary Diagnosis:   Hepatic simple cyst    Secondary Diagnoses:                Principal Problem:    Simple hepatic cyst POA: Unknown  Active Problems:    Essential (primary) hypertension POA: Unknown    Contraindication to deep vein thrombosis (DVT) prophylaxis POA: Unknown  Resolved Problems:    * No resolved hospital problems. *      Hospital Summary (Brief Narrative):       78 yo female with a history of hypertension and liver cyst first ID [id number].  She presented for abdominal pain several days after evaluation at [facility] with CT showing 12 cm mass.  She had leukocytosis and as result was initially treated for suspected liver sepsis.  Ceftriaxone and metronidazole were started.  Iron liver drainage was performed 7/24/2019.  600 mL of serous fluid was removed.  Initial fluid analysis showed no organisms and rare WBCs.  Culture is pending.  Cytology pending.  After the procedure, the pain was much improved.  After discussion with pharmacy ([facility]) antibiotics were discontinued and no antibiotics were given on discharge.  Time was spent discussing the next course of action with the patient, which includes outpatient follow-up with her primary care physician for pending cytology and cultures, followed by referral to hepatobiliary surgeon.  Details provided below.  Her hospital course was complicated by hypertensive urgency with initial systolic blood pressure in the 180s while in the ED.  She received IV hydralazine for this.  On the floor her home regiment was adjusted to include increase losartan (75 mg daily).  PRN oral hydralazine was continued.  She did well on this regimen and was  discharged on losartan 75 mg daily along with her PRN hydralazine, which was unchanged.    Patient /Hospital Summary (Details -- Problem Oriented) :          * Simple hepatic cyst   Assessment & Plan    -Diagnosis of simple cyst given imaging showing no septations and initial fluid analysis with no organisms seen.    -Symptom:  Upper abdominal pain.  No fever or chills.  -On admission:  WBC 14.7 ANC 13. On discharge, WBC 6.6  -Cyst first ID 2014, but did not warrant intervention at that time.    -CT abdomen at [place] showed a nonseptated 12cm hypoechoic liver mass  -US at [facility] showed a large hepatic cyst with a smaller adjacent hepatic cyst.  No evidence of gallstones or biliary ductal dilatation.  -Differentials include hydatid cyst, Hepatic mucinous cystic neoplasm, but this is not supported by fluid analysis at this time.  -IR liver drainage 7/24- drained 680ml serous fluid showing no organisms, rare WBCs, culture report pending  -Discussed case with surgery.  If asymptomatic, no need for inpatient surgery consult.  She is ok for outpatient referral.  -Hospital course:  Started ceftriaxone and metronidazole on admission, given concern for liver abscess.  IR liver drainage 7/24.  Antibiotics stopped 7/25 after fluid analysis negative for organisim.  No antibiotics on discharge.  Discussed with pharmacy ([facility])  -Discharge plan:  Discharged 7/25.  She is scheduled to see primary care in the next week.  Pending studies include liver fluid culture and liver fluid cytology. The patient requested a referral to an appropriate surgeon that could perform a laproscopic cyst unroofing when she becomes symptomatic again.  Dr. Vinny Aquino is a general surgeon in [place] with a subspecialty in hepatobiliary surgery.  Referral by primary care to Dr. Aquino in 3-4 weeks would be appropriate.  If pending cytology and culture suggest an etiology other than simple cyst she would benefit from a referral to a  hepatologist such as Dr. Олег Pickett in [place] with [facility].  She was discharged with no antibiotics and no pain medications given resolution of pain.         Essential (primary) hypertension   Assessment & Plan    -SBP range on floor now 110s to 140s.  One time high of 186.  -On admission had hypertensive urgency  -Home SBP 170s to 180s for 2 months prior to admission despite PRN hydralazine and losartan.  -In ED:  SBP in the 170 to 180.  She required IV hydralazine for control  -No evidence of endorgan dominant as evidenced by negative troponin, normal creatinine, normal mentation.  -Follows with Dr. Layton, cardiology and received home antihypertensive regiment of scheduled losartan and PRN hydralazine.  -Hospital course:  Increased home losartan 75 mg daily.  Provided PRN hydralazine. She continued to have no evidence of end organ damage  -Discharge plan:  Patient to follow up with Dr. Layton as scheduled.  Discharging with losartan 75 mg daily and no change in PRN hydralazine dose. She was advised to continue monitoring her blood pressure.  She was instructed to return to her prior dose of losartan if she consistently had systolic blood pressures less than 110.     Contraindication to deep vein thrombosis (DVT) prophylaxis   Assessment & Plan    -Held lovenox on admission and provided SCD  -Started Lovenox after IR liver drainage.         Consultants:     None    Procedures:        Interventional radiology drainage of liver cyst    Imaging/ Testing:      Abdomen ultrasound showed:  Large hepatic cyst. Smaller adjacent hepatic cyst is also seen.  Atherosclerotic plaque also observed.    Discharge Medications:         Medication Reconciliation: Completed       Medication List      CHANGE how you take these medications      Instructions   losartan 50 MG Tabs  What changed:  how much to take  Commonly known as:  COZAAR   Take 1.5 Tabs by mouth every day.  Dose:  75 mg        CONTINUE taking these  medications      Instructions   acetaminophen 325 MG Tabs  Commonly known as:  TYLENOL   Take 650 mg by mouth 2 Times a Day.  Dose:  650 mg     docusate sodium 100 MG Caps  Commonly known as:  COLACE   Take 100 mg by mouth 2 times a day.  Dose:  100 mg     * gabapentin 100 MG Caps  Commonly known as:  NEURONTIN   Take 100 mg by mouth every morning.  Dose:  100 mg     * gabapentin 300 MG Caps  Commonly known as:  NEURONTIN   Take 300 mg by mouth every bedtime.  Dose:  300 mg     hydrALAZINE 25 MG Tabs  Commonly known as:  APRESOLINE   Take 25 mg by mouth 4 times a day as needed (SBP> 160).  Dose:  25 mg     tramadol 50 MG Tabs  Commonly known as:  ULTRAM   Take 50 mg by mouth every four hours as needed for Mild Pain.  Dose:  50 mg        * This list has 2 medication(s) that are the same as other medications prescribed for you. Read the directions carefully, and ask your doctor or other care provider to review them with you.                Can use .DISCHARGEMEDSLIST if going to another facility         Disposition:   Discharged home  Diet:   Regular home diet  Activity:   As tolerated.  Patient had no mobility issues.  Instructions:      The patient was instructed to return to the ER in the event of worsening symptoms. I have counseled the patient on the importance of compliance and the patient has agreed to proceed with all medical recommendations and follow up plan indicated above.   The patient understands that all medications come with benefits and risks. Risks may include permanent injury or death and these risks can be minimized with close reassessment and monitoring.        Primary Care Provider:    Dr. Munoz.  Discharge summary faxed to primary care provider:  Deferred  Copy of discharge summary given to the patient: Deferred      Follow up appointment details :      -Scheduling confirmed patient has outpatient follow-up with her primary care within 1 to 2 weeks.    Pending Studies:        -Liver fluid  cytology and cultures    Time spent on discharge day patient visit, preparing discharge paperwork and arranging for patient follow up.    Summary of follow up issues:   -Preliminary diagnosis of liver simple cyst.  Pending cytology and cultures.  Patient may benefit from referral to hepatobiliary surgeon and/or pathologist.  -Atherosclerotic plaque on ultrasound of abdomen.  Primary care to obtain lipid panel if not done so recently and to consider initiation of statin.    Discharge Time (Minutes) :    ~35mins  Hospital Course Type:  Inpatient Stay >2 midnights    Condition on Discharge    ______________________________________________________________________    Interval history/exam for day of discharge:    -No acute overnight events.  -Patient underwent IR guided drainage of hepatic cyst 7/24.  680 mL of clear serous fluid was removed.  Gram stain of the fluid showed rare WBCs and no organisms. Culture pending  -Patient's blood pressure was elevated one time with a /83.  Othere pereira she has run in the 110s to 140s.  -Patient is on day 3 of ceftriaxone and metronidazole.  Her white count is down to 6.6 from 10.6.  ABX stopped with liver fluid showing no organisms.  Pharm ([facility]) agrees with no discharge on antibiotics.  -Patient advised for outpatient surgery referral for further evaluation of simple hepatic cyst       Vitals:    07/24/19 1730 07/24/19 1910 07/25/19 0450 07/25/19 0800   BP: 142/68 129/60 (!) 186/83 117/63   Pulse: 70 69 66 73   Resp:  16 16 16   Temp:  36.6 °C (97.9 °F) 36.9 °C (98.4 °F) 36.6 °C (97.9 °F)   TempSrc:  Temporal Temporal Temporal   SpO2: 98% 93% 97% 92%   Weight:       Height:         Weight/BMI: Body mass index is 26.83 kg/m².  Pulse Oximetry: 92 %, O2 Delivery: None (Room Air)    Constitutional: She is oriented to person, place, and time and well-developed, well-nourished, and in no distress. No distress.   HENT:   Head: Normocephalic and atraumatic.   Mallampati 3.   Eyes:  Pupils are equal, round, and reactive to light. Conjunctivae and EOM are normal.   Neck: Normal range of motion. Neck supple. No JVD present.   Cardiovascular: Normal rate, regular rhythm and normal heart sounds.  Exam reveals no friction rub.    No murmur heard.  Pulmonary/Chest: Effort normal and breath sounds normal. No respiratory distress. She has no wheezes.   Abdominal: Soft. Bowel sounds are normal. She exhibits no distension. There is tenderness (improved, soreness in RUQ at site of IR drainage). There is no rebound.   Musculoskeletal: Normal range of motion. She exhibits edema (2+ pitting edema B/L).   Neurological: She is alert and oriented to person, place, and time.   Skin: Skin is warm and dry. No rash noted. She is not diaphoretic. No erythema.   Psychiatric: Affect and judgment normal.        Most Recent Labs:    Lab Results   Component Value Date/Time    WBC 6.6 07/25/2019 04:40 AM    RBC 4.46 07/25/2019 04:40 AM    HEMOGLOBIN 14.3 07/25/2019 04:40 AM    HEMATOCRIT 43.7 07/25/2019 04:40 AM    MCV 98.0 (H) 07/25/2019 04:40 AM    MCH 32.1 07/25/2019 04:40 AM    MCHC 32.7 (L) 07/25/2019 04:40 AM    MPV 9.9 07/25/2019 04:40 AM    NEUTSPOLYS 52.00 07/25/2019 04:40 AM    LYMPHOCYTES 31.30 07/25/2019 04:40 AM    MONOCYTES 12.10 07/25/2019 04:40 AM    EOSINOPHILS 2.60 07/25/2019 04:40 AM    BASOPHILS 1.20 07/25/2019 04:40 AM      Lab Results   Component Value Date/Time    SODIUM 138 07/25/2019 04:40 AM    POTASSIUM 4.6 07/25/2019 04:40 AM    CHLORIDE 109 07/25/2019 04:40 AM    CO2 22 07/25/2019 04:40 AM    GLUCOSE 97 07/25/2019 04:40 AM    BUN 17 07/25/2019 04:40 AM    CREATININE 0.69 07/25/2019 04:40 AM      Lab Results   Component Value Date/Time    ALTSGPT 12 07/25/2019 04:40 AM    ASTSGOT 26 07/25/2019 04:40 AM    ALKPHOSPHAT 76 07/25/2019 04:40 AM    TBILIRUBIN 0.4 07/25/2019 04:40 AM    LIPASE 13 07/23/2019 11:22 AM    ALBUMIN 3.4 07/25/2019 04:40 AM    GLOBULIN 2.6 07/25/2019 04:40 AM    INR 1.02  07/23/2019 11:22 AM     Lab Results   Component Value Date/Time    PROTHROMBTM 13.6 07/23/2019 11:22 AM    INR 1.02 07/23/2019 11:22 AM

## 2019-07-27 LAB
BACTERIA FLD AEROBE CULT: NORMAL
CYTOLOGY REG CYTOL: NORMAL
GRAM STN SPEC: NORMAL
SIGNIFICANT IND 70042: NORMAL
SITE SITE: NORMAL
SOURCE SOURCE: NORMAL

## 2019-07-28 LAB
BACTERIA BLD CULT: NORMAL
BACTERIA BLD CULT: NORMAL
SIGNIFICANT IND 70042: NORMAL
SIGNIFICANT IND 70042: NORMAL
SITE SITE: NORMAL
SITE SITE: NORMAL
SOURCE SOURCE: NORMAL
SOURCE SOURCE: NORMAL

## 2019-07-29 LAB
BACTERIA SPEC ANAEROBE CULT: NORMAL
SIGNIFICANT IND 70042: NORMAL
SITE SITE: NORMAL
SOURCE SOURCE: NORMAL

## 2019-08-19 NOTE — PROGRESS NOTES
"Subjective:   8/20/2019  2:35 PM  Primary care physician:Gregory Munoz D.O.  Referring Provider: Gregory Munoz D.O.      Chief Complaint: No chief complaint on file.    Diagnosis:   1. Liver cyst     2. Abdominal pain, unspecified abdominal location     3. Abnormal CT of the abdomen         History of presenting illness:  Catherine Huitron  is a pleasant 79 y.o. year old female who presented with first visit after having been discharged from the hospital for nausea vomiting and severe chest pain.  The patient was found to have a massive liver cyst causing compression of her stomach and duodenum.  Patient was in moderate extremes of the time thus we sent her to interventional radiology to have the cyst drained and see if this is what was the source of her difficulties.  She had immediate relief upon drainage of her cyst.  The patient did well was discharged home.  She was referred in to see me again for follow-up.  She denies any fever or chills, nausea or vomiting but is starting to have that pressure symptom again as well as early satiety and reflux.  The patient feels similar to what she had prior to admission prior to getting really ill.  She is here to discuss management of the cyst.  She has not had repeat imaging after interventional radiology drain almost 700 cc from the cyst.      Past Medical History:   Diagnosis Date   • Chronic venous insufficiency 2018   • Hypertension      Past Surgical History:   Procedure Laterality Date   • SCLEROTHERAPHY Right 10/2018     Allergies   Allergen Reactions   • Antibiotic [Neomycin-Polymyxin B]      Can't take any antibiotics   • Lisinopril      \"puffy\" and cough.  Possible angioedema     Outpatient Encounter Medications as of 8/20/2019   Medication Sig Dispense Refill   • losartan (COZAAR) 50 MG Tab Take 1.5 Tabs by mouth every day. 30 Tab 0   • tramadol (ULTRAM) 50 MG Tab Take 50 mg by mouth every four hours as needed for Mild Pain.     • gabapentin (NEURONTIN) 100 MG " Cap Take 100 mg by mouth every morning.     • gabapentin (NEURONTIN) 300 MG Cap Take 300 mg by mouth every bedtime.     • hydrALAZINE (APRESOLINE) 25 MG Tab Take 25 mg by mouth 4 times a day as needed (SBP> 160).     • acetaminophen (TYLENOL) 325 MG Tab Take 650 mg by mouth 2 Times a Day.     • docusate sodium (COLACE) 100 MG Cap Take 100 mg by mouth 2 times a day.       No facility-administered encounter medications on file as of 8/20/2019.      Social History     Socioeconomic History   • Marital status:      Spouse name: Not on file   • Number of children: Not on file   • Years of education: Not on file   • Highest education level: Not on file   Occupational History   • Not on file   Social Needs   • Financial resource strain: Not on file   • Food insecurity:     Worry: Not on file     Inability: Not on file   • Transportation needs:     Medical: Not on file     Non-medical: Not on file   Tobacco Use   • Smoking status: Never Smoker   • Smokeless tobacco: Never Used   Substance and Sexual Activity   • Alcohol use: No   • Drug use: No   • Sexual activity: Not on file   Lifestyle   • Physical activity:     Days per week: Not on file     Minutes per session: Not on file   • Stress: Not on file   Relationships   • Social connections:     Talks on phone: Not on file     Gets together: Not on file     Attends Yazdanism service: Not on file     Active member of club or organization: Not on file     Attends meetings of clubs or organizations: Not on file     Relationship status: Not on file   • Intimate partner violence:     Fear of current or ex partner: Not on file     Emotionally abused: Not on file     Physically abused: Not on file     Forced sexual activity: Not on file   Other Topics Concern   • Not on file   Social History Narrative   • Not on file      Social History     Tobacco Use   Smoking Status Never Smoker   Smokeless Tobacco Never Used     Social History     Substance and Sexual Activity   Alcohol  Use No     Social History     Substance and Sexual Activity   Drug Use No        Family History   Problem Relation Age of Onset   • Cancer Father        Review of Systems   Constitutional: Positive for weight loss.   Respiratory: Positive for shortness of breath.    Gastrointestinal: Positive for abdominal pain.   Genitourinary: Positive for frequency.   All other systems reviewed and are negative.       Objective:   There were no vitals taken for this visit.    Physical Exam   Constitutional: She is oriented to person, place, and time. She appears well-developed and well-nourished.   HENT:   Head: Normocephalic and atraumatic.   Eyes: Pupils are equal, round, and reactive to light. Conjunctivae are normal.   Neck: Normal range of motion. Neck supple.   Cardiovascular: Normal rate and regular rhythm.   Pulmonary/Chest: Effort normal and breath sounds normal.   Abdominal: Soft. Bowel sounds are normal. There is tenderness.   Neurological: She is alert and oriented to person, place, and time.   Skin: Skin is warm and dry.   Psychiatric: She has a normal mood and affect. Her behavior is normal. Judgment and thought content normal.   Nursing note and vitals reviewed.      Labs:  Results for LENA VOGEL (MRN [id number]) as of 8/19/2019 15:15   Ref. Range 7/25/2019 04:40   WBC Latest Ref Range: 4.8 - 10.8 K/uL 6.6   RBC Latest Ref Range: 4.20 - 5.40 M/uL 4.46   Hemoglobin Latest Ref Range: 12.0 - 16.0 g/dL 14.3   Hematocrit Latest Ref Range: 37.0 - 47.0 % 43.7   MCV Latest Ref Range: 81.4 - 97.8 fL 98.0 (H)   MCH Latest Ref Range: 27.0 - 33.0 pg 32.1   MCHC Latest Ref Range: 33.6 - 35.0 g/dL 32.7 (L)   RDW Latest Ref Range: 35.9 - 50.0 fL 46.6   Platelet Count Latest Ref Range: 164 - 446 K/uL 283   MPV Latest Ref Range: 9.0 - 12.9 fL 9.9   Neutrophils-Polys Latest Ref Range: 44.00 - 72.00 % 52.00   Neutrophils (Absolute) Latest Ref Range: 2.00 - 7.15 K/uL 3.45   Lymphocytes Latest Ref Range: 22.00 - 41.00 % 31.30      Lymphs (Absolute) Latest Ref Range: 1.00 - 4.80 K/uL 2.07   Monocytes Latest Ref Range: 0.00 - 13.40 % 12.10   Monos (Absolute) Latest Ref Range: 0.00 - 0.85 K/uL 0.80   Eosinophils Latest Ref Range: 0.00 - 6.90 % 2.60   Eos (Absolute) Latest Ref Range: 0.00 - 0.51 K/uL 0.17   Basophils Latest Ref Range: 0.00 - 1.80 % 1.20   Baso (Absolute) Latest Ref Range: 0.00 - 0.12 K/uL 0.08   Immature Granulocytes Latest Ref Range: 0.00 - 0.90 % 0.80   Immature Granulocytes (abs) Latest Ref Range: 0.00 - 0.11 K/uL 0.05   Nucleated RBC Latest Units: /100 WBC 0.00   NRBC (Absolute) Latest Units: K/uL 0.00   Sodium Latest Ref Range: 135 - 145 mmol/L 138   Potassium Latest Ref Range: 3.6 - 5.5 mmol/L 4.6   Chloride Latest Ref Range: 96 - 112 mmol/L 109   Co2 Latest Ref Range: 20 - 33 mmol/L 22   Anion Gap Latest Ref Range: 0.0 - 11.9  7.0   Glucose Latest Ref Range: 65 - 99 mg/dL 97   Bun Latest Ref Range: 8 - 22 mg/dL 17   Creatinine Latest Ref Range: 0.50 - 1.40 mg/dL 0.69   GFR If  Latest Ref Range: >60 mL/min/1.73 m 2 >60   GFR If Non  Latest Ref Range: >60 mL/min/1.73 m 2 >60   Calcium Latest Ref Range: 8.5 - 10.5 mg/dL 8.8   AST(SGOT) Latest Ref Range: 12 - 45 U/L 26   ALT(SGPT) Latest Ref Range: 2 - 50 U/L 12   Alkaline Phosphatase Latest Ref Range: 30 - 99 U/L 76   Total Bilirubin Latest Ref Range: 0.1 - 1.5 mg/dL 0.4   Albumin Latest Ref Range: 3.2 - 4.9 g/dL 3.4   Total Protein Latest Ref Range: 6.0 - 8.2 g/dL 6.0   Globulin Latest Ref Range: 1.9 - 3.5 g/dL 2.6   A-G Ratio Latest Units: g/dL 1.3       Imaging:  Per my read, 7/19/19 CT         Pathology: 7/24/19    A. Liver cyst fluid:         No evidence of malignancy.         The ThinPrep slide demonstrates scattered histiocytes with          abundant amorphous material. A few scattered white blood cells          are noted. The cell block demonstrates predominantly squames.         No atypical or malignant cells are seen.    Procedures:  7/24/19    1.  Successful ultrasound-guided aspiration of liver cyst.  2.  An approximately 680 mL of serous fluid was aspirated.            Diagnosis:     1. Liver cyst     2. Abdominal pain, unspecified abdominal location     3. Abnormal CT of the abdomen             Medical Decision Making:  Today's Assessment / Status / Plan:     In light of the present findings, the patient had a fairly complicated course related to the cyst that she continues to start slowly developing the similar symptoms.  My recommendation is to proceed with a repeat liver CT to assess whether it has returned or larger or is compressing her stomach like before.  She did have instant relief when we drained it through interventional radiology to help her recover immediately.  When she undergoes her CT scan she will call us and I will review the pictures after we electronically pushed him to [facility].    She agreed and verbalized her agreement and understanding with the current plan. I answered all questions and concerns she has at this time and advised her to call at any time in the interim with questions or concerns in regards to her care.    Thank you for allowing me to participate in her care, I will continue to follow closely.       Please note that this dictation was created using voice recognition software. I have made every reasonable attempt to correct obvious errors, but I expect that there are errors of grammar and possibly content that I did not discover before finalizing the note.     Thank you for this consultation and allowing me to participate in your patient's care. If I can be of further service please contact my office.

## 2019-08-20 ENCOUNTER — OFFICE VISIT (OUTPATIENT)
Dept: SURGERY | Facility: MEDICAL CENTER | Age: 79
End: 2019-08-20
Payer: MEDICARE

## 2019-08-20 VITALS
SYSTOLIC BLOOD PRESSURE: 148 MMHG | WEIGHT: 146.83 LBS | HEART RATE: 70 BPM | TEMPERATURE: 98.4 F | DIASTOLIC BLOOD PRESSURE: 64 MMHG | BODY MASS INDEX: 26.02 KG/M2 | HEIGHT: 63 IN | OXYGEN SATURATION: 96 %

## 2019-08-20 DIAGNOSIS — K76.89 LIVER CYST: ICD-10-CM

## 2019-08-20 DIAGNOSIS — R10.9 ABDOMINAL PAIN, UNSPECIFIED ABDOMINAL LOCATION: ICD-10-CM

## 2019-08-20 DIAGNOSIS — R93.5 ABNORMAL CT OF THE ABDOMEN: ICD-10-CM

## 2019-08-20 PROCEDURE — 99215 OFFICE O/P EST HI 40 MIN: CPT | Performed by: SURGERY

## 2019-08-20 ASSESSMENT — ENCOUNTER SYMPTOMS
SHORTNESS OF BREATH: 1
WEIGHT LOSS: 1
ABDOMINAL PAIN: 1

## 2019-08-20 NOTE — PATIENT INSTRUCTIONS
The patient will undergo repeat CT scan of the liver and [facility] and she will call me after it has been completed to discuss the results of the CT scan.

## 2019-08-23 DIAGNOSIS — Q44.6: ICD-10-CM

## 2019-08-26 ENCOUNTER — HOSPITAL ENCOUNTER (OUTPATIENT)
Dept: RADIOLOGY | Facility: MEDICAL CENTER | Age: 79
End: 2019-08-26

## 2019-08-27 ENCOUNTER — TELEPHONE (OUTPATIENT)
Dept: SURGERY | Facility: MEDICAL CENTER | Age: 79
End: 2019-08-27

## 2019-08-27 ENCOUNTER — HOSPITAL ENCOUNTER (EMERGENCY)
Facility: MEDICAL CENTER | Age: 79
End: 2019-08-27
Attending: EMERGENCY MEDICINE
Payer: MEDICARE

## 2019-08-27 VITALS
HEIGHT: 63 IN | HEART RATE: 70 BPM | BODY MASS INDEX: 25.98 KG/M2 | WEIGHT: 146.61 LBS | RESPIRATION RATE: 18 BRPM | TEMPERATURE: 98.4 F | DIASTOLIC BLOOD PRESSURE: 67 MMHG | OXYGEN SATURATION: 95 % | SYSTOLIC BLOOD PRESSURE: 160 MMHG

## 2019-08-27 DIAGNOSIS — I10 HYPERTENSION, UNSPECIFIED TYPE: ICD-10-CM

## 2019-08-27 LAB
ALBUMIN SERPL BCP-MCNC: 4.4 G/DL (ref 3.2–4.9)
ALBUMIN/GLOB SERPL: 1.6 G/DL
ALP SERPL-CCNC: 116 U/L (ref 30–99)
ALT SERPL-CCNC: 16 U/L (ref 2–50)
ANION GAP SERPL CALC-SCNC: 9 MMOL/L (ref 0–11.9)
APPEARANCE UR: CLEAR
AST SERPL-CCNC: 17 U/L (ref 12–45)
BASOPHILS # BLD AUTO: 0.7 % (ref 0–1.8)
BASOPHILS # BLD: 0.06 K/UL (ref 0–0.12)
BILIRUB SERPL-MCNC: 0.4 MG/DL (ref 0.1–1.5)
BILIRUB UR QL STRIP.AUTO: NEGATIVE
BUN SERPL-MCNC: 14 MG/DL (ref 8–22)
CALCIUM SERPL-MCNC: 10 MG/DL (ref 8.5–10.5)
CHLORIDE SERPL-SCNC: 103 MMOL/L (ref 96–112)
CO2 SERPL-SCNC: 24 MMOL/L (ref 20–33)
COLOR UR: YELLOW
CREAT SERPL-MCNC: 0.74 MG/DL (ref 0.5–1.4)
EOSINOPHIL # BLD AUTO: 0.06 K/UL (ref 0–0.51)
EOSINOPHIL NFR BLD: 0.7 % (ref 0–6.9)
ERYTHROCYTE [DISTWIDTH] IN BLOOD BY AUTOMATED COUNT: 44.1 FL (ref 35.9–50)
GLOBULIN SER CALC-MCNC: 2.8 G/DL (ref 1.9–3.5)
GLUCOSE SERPL-MCNC: 94 MG/DL (ref 65–99)
GLUCOSE UR STRIP.AUTO-MCNC: NEGATIVE MG/DL
HCT VFR BLD AUTO: 43.7 % (ref 37–47)
HGB BLD-MCNC: 14.6 G/DL (ref 12–16)
IMM GRANULOCYTES # BLD AUTO: 0.02 K/UL (ref 0–0.11)
IMM GRANULOCYTES NFR BLD AUTO: 0.2 % (ref 0–0.9)
KETONES UR STRIP.AUTO-MCNC: NEGATIVE MG/DL
LEUKOCYTE ESTERASE UR QL STRIP.AUTO: NEGATIVE
LIPASE SERPL-CCNC: 27 U/L (ref 11–82)
LYMPHOCYTES # BLD AUTO: 1.75 K/UL (ref 1–4.8)
LYMPHOCYTES NFR BLD: 21.8 % (ref 22–41)
MCH RBC QN AUTO: 32.7 PG (ref 27–33)
MCHC RBC AUTO-ENTMCNC: 33.4 G/DL (ref 33.6–35)
MCV RBC AUTO: 98 FL (ref 81.4–97.8)
MICRO URNS: NORMAL
MONOCYTES # BLD AUTO: 0.71 K/UL (ref 0–0.85)
MONOCYTES NFR BLD AUTO: 8.9 % (ref 0–13.4)
NEUTROPHILS # BLD AUTO: 5.41 K/UL (ref 2–7.15)
NEUTROPHILS NFR BLD: 67.7 % (ref 44–72)
NITRITE UR QL STRIP.AUTO: NEGATIVE
NRBC # BLD AUTO: 0 K/UL
NRBC BLD-RTO: 0 /100 WBC
PH UR STRIP.AUTO: 5.5 [PH] (ref 5–8)
PLATELET # BLD AUTO: 239 K/UL (ref 164–446)
PMV BLD AUTO: 9.8 FL (ref 9–12.9)
POTASSIUM SERPL-SCNC: 4 MMOL/L (ref 3.6–5.5)
PROT SERPL-MCNC: 7.2 G/DL (ref 6–8.2)
PROT UR QL STRIP: NEGATIVE MG/DL
RBC # BLD AUTO: 4.46 M/UL (ref 4.2–5.4)
RBC UR QL AUTO: NEGATIVE
SODIUM SERPL-SCNC: 136 MMOL/L (ref 135–145)
SP GR UR STRIP.AUTO: 1.01
TROPONIN T SERPL-MCNC: 11 NG/L (ref 6–19)
UROBILINOGEN UR STRIP.AUTO-MCNC: 0.2 MG/DL
WBC # BLD AUTO: 8 K/UL (ref 4.8–10.8)

## 2019-08-27 PROCEDURE — 81003 URINALYSIS AUTO W/O SCOPE: CPT

## 2019-08-27 PROCEDURE — 84484 ASSAY OF TROPONIN QUANT: CPT

## 2019-08-27 PROCEDURE — 80053 COMPREHEN METABOLIC PANEL: CPT

## 2019-08-27 PROCEDURE — 85025 COMPLETE CBC W/AUTO DIFF WBC: CPT

## 2019-08-27 PROCEDURE — 83690 ASSAY OF LIPASE: CPT

## 2019-08-27 PROCEDURE — 36415 COLL VENOUS BLD VENIPUNCTURE: CPT

## 2019-08-27 PROCEDURE — 99284 EMERGENCY DEPT VISIT MOD MDM: CPT

## 2019-08-27 RX ORDER — HYDRALAZINE HYDROCHLORIDE 25 MG/1
25 TABLET, FILM COATED ORAL 4 TIMES DAILY PRN
Qty: 90 TAB | Refills: 0 | Status: SHIPPED
Start: 2019-08-27 | End: 2020-02-25

## 2019-08-27 RX ORDER — LOSARTAN POTASSIUM 50 MG/1
75 TABLET ORAL DAILY
Qty: 30 TAB | Refills: 0 | Status: ON HOLD
Start: 2019-08-27 | End: 2019-09-09

## 2019-08-27 NOTE — ED NOTES
Pt ambulatory to yellow 63.  Pt here for above complaint.  Connected to cardiac monitor, BP cuff, and continuous pulse ox upon arrival.  Pt in gown, call light in reach, bed in lowest position.  Chart up for ERP.  Urine sent to lab.

## 2019-08-27 NOTE — TELEPHONE ENCOUNTER
Patient called this morning informing me that her BP was high due to her liver cyst filling back up with fluid and that I needed to schedule her for surgery ASAP. I tried to explain to her that her cyst wouldn't have anything to due with her BP being high, but she was insisting I was incorrect. I informed the patient that Dr. Souza was out of town and that she should call her PCP or go to the emergency room if she couldn't get it under control with meds. I did speak with Dr Aquino who confirmed that her BP wouldn't be high due to her liver cyst to said the same thing, that if she can't get it under control to call PCP or go to ER.

## 2019-08-27 NOTE — ED TRIAGE NOTES
"Pt amb to triage. Pt concerned that BP was high this AM, 174/71. Pt took her hydralazine. Pt with chronic cyst to liver, called MD office and was reassured it's not her cyst causing it. This RN familiar with pt from last stay and was very concerned about her BP at that time. Pt states \"it just causes me so much anxiety\".   "

## 2019-08-28 NOTE — DISCHARGE INSTRUCTIONS
Please keep your appointment with your surgeon on September 9.  Return here for any further issues or concerns.

## 2019-08-28 NOTE — ED PROVIDER NOTES
"ED Provider Note    CHIEF COMPLAINT  Chief Complaint   Patient presents with   • Blood Pressure Problem       HPI  Catherine Huitron is a 79 y.o. female here for evaluation of hypertension.  The patient states that in the mornings when she gets up, she has an elevated blood pressure, she then takes her hydralazine, and this improves.  She states that this is been ongoing for quite some time, but she has not seen her doctor to change any medications recently.  She does take losartan for her blood pressure.  She has no chest pain, no shortness breath, no back pain, no abdominal pain, and no fever chills.  Patient states that she is also worried about a \"really large liver cyst\" but she is following up on this with her surgeon on September 9.  She has not had any vomiting, fevers or chills, she has no nausea.  Patient states that taking the hydralazine does improve her blood pressure, while not taking it, makes this worse.  She has no other medical concerns at this time.    PAST MEDICAL HISTORY   has a past medical history of Chronic venous insufficiency (2018) and Hypertension.    SOCIAL HISTORY  Social History     Tobacco Use   • Smoking status: Never Smoker   • Smokeless tobacco: Never Used   Substance and Sexual Activity   • Alcohol use: No   • Drug use: No   • Sexual activity: Not on file       Family History  No bleeding disorders    SURGICAL HISTORY   has a past surgical history that includes sclerotheraphy (Right, 10/2018).    CURRENT MEDICATIONS  Home Medications    **Home medications have not yet been reviewed for this encounter**         ALLERGIES  Allergies   Allergen Reactions   • Antibiotic [Neomycin-Polymyxin B]      Can't take any antibiotics   • Lisinopril      \"puffy\" and cough.  Possible angioedema       REVIEW OF SYSTEMS  See HPI for further details. Review of systems as above, otherwise all other systems are negative.     PHYSICAL EXAM  Constitutional: Well developed, well nourished. No acute " distress.  HEENT: Normocephalic, atraumatic. Posterior pharynx clear and moist.  Eyes:  EOMI. Normal sclera.  Neck: Supple, Full range of motion, nontender.  Chest/Pulmonary: clear to ausculation. Symmetrical expansion.   Cardio: Regular rate and rhythm with no murmur.   Abdomen: Soft, nontender. No peritoneal signs. No guarding. No palpable masses.  Back: No CVA tenderness, nontender midline, no step offs.  Musculoskeletal: No deformity, no edema, neurovascular intact.   Neuro: Clear speech, appropriate, cooperative, cranial nerves II-XII grossly intact.  Psych: Normal mood and affect    Results for orders placed or performed during the hospital encounter of 08/27/19   CBC WITH DIFFERENTIAL   Result Value Ref Range    WBC 8.0 4.8 - 10.8 K/uL    RBC 4.46 4.20 - 5.40 M/uL    Hemoglobin 14.6 12.0 - 16.0 g/dL    Hematocrit 43.7 37.0 - 47.0 %    MCV 98.0 (H) 81.4 - 97.8 fL    MCH 32.7 27.0 - 33.0 pg    MCHC 33.4 (L) 33.6 - 35.0 g/dL    RDW 44.1 35.9 - 50.0 fL    Platelet Count 239 164 - 446 K/uL    MPV 9.8 9.0 - 12.9 fL    Neutrophils-Polys 67.70 44.00 - 72.00 %    Lymphocytes 21.80 (L) 22.00 - 41.00 %    Monocytes 8.90 0.00 - 13.40 %    Eosinophils 0.70 0.00 - 6.90 %    Basophils 0.70 0.00 - 1.80 %    Immature Granulocytes 0.20 0.00 - 0.90 %    Nucleated RBC 0.00 /100 WBC    Neutrophils (Absolute) 5.41 2.00 - 7.15 K/uL    Lymphs (Absolute) 1.75 1.00 - 4.80 K/uL    Monos (Absolute) 0.71 0.00 - 0.85 K/uL    Eos (Absolute) 0.06 0.00 - 0.51 K/uL    Baso (Absolute) 0.06 0.00 - 0.12 K/uL    Immature Granulocytes (abs) 0.02 0.00 - 0.11 K/uL    NRBC (Absolute) 0.00 K/uL   COMP METABOLIC PANEL   Result Value Ref Range    Sodium 136 135 - 145 mmol/L    Potassium 4.0 3.6 - 5.5 mmol/L    Chloride 103 96 - 112 mmol/L    Co2 24 20 - 33 mmol/L    Anion Gap 9.0 0.0 - 11.9    Glucose 94 65 - 99 mg/dL    Bun 14 8 - 22 mg/dL    Creatinine 0.74 0.50 - 1.40 mg/dL    Calcium 10.0 8.5 - 10.5 mg/dL    AST(SGOT) 17 12 - 45 U/L    ALT(SGPT) 16  2 - 50 U/L    Alkaline Phosphatase 116 (H) 30 - 99 U/L    Total Bilirubin 0.4 0.1 - 1.5 mg/dL    Albumin 4.4 3.2 - 4.9 g/dL    Total Protein 7.2 6.0 - 8.2 g/dL    Globulin 2.8 1.9 - 3.5 g/dL    A-G Ratio 1.6 g/dL   LIPASE   Result Value Ref Range    Lipase 27 11 - 82 U/L   URINALYSIS,CULTURE IF INDICATED   Result Value Ref Range    Color Yellow     Character Clear     Specific Gravity 1.007 <1.035    Ph 5.5 5.0 - 8.0    Glucose Negative Negative mg/dL    Ketones Negative Negative mg/dL    Protein Negative Negative mg/dL    Bilirubin Negative Negative    Urobilinogen, Urine 0.2 Negative    Nitrite Negative Negative    Leukocyte Esterase Negative Negative    Occult Blood Negative Negative    Micro Urine Req see below    TROPONIN   Result Value Ref Range    Troponin T 11 6 - 19 ng/L   ESTIMATED GFR   Result Value Ref Range    GFR If African American >60 >60 mL/min/1.73 m 2    GFR If Non African American >60 >60 mL/min/1.73 m 2         PROCEDURES     MEDICAL RECORD  I have reviewed patient's medical record and pertinent results are listed above.    COURSE & MEDICAL DECISION MAKING  I have reviewed any medical record information, laboratory studies and radiographic results as noted above.    5:24 PM  The patient is nontoxic-appearing, afebrile, and has a systolic blood pressure of 158 at this time.  She has no chest pain, no shortness of breath, no back pain, no headache, and no vision changes.  She has an appointment with Dr. Dani Combs on September 9, and will keep this appointment for follow-up.  She has no vomiting no fever, and at this time she will be discharged home.    If you have had any blood pressure issues while here in the emergency department, please see your doctor for a further evaluation or work up.    Differential diagnoses include but not limited to: MI, hypertension urgency, hypertensive emergency, IDANIA.    This patient presents with upper tension.  At this time, I have counseled the  patient/family regarding their medications, pain control, and follow up.  They will continue their medications, if any, as prescribed.  They will return immediately for any worsening symptoms and/or any other medical concerns.  They will see their doctor, or contact the doctor provided, in 1-2 days for follow up.       FINAL IMPRESSION  Hypertension, resolved      Electronically signed by: Sly Church, 8/27/2019 5:10 PM

## 2019-09-09 ENCOUNTER — HOSPITAL ENCOUNTER (INPATIENT)
Facility: MEDICAL CENTER | Age: 79
LOS: 1 days | DRG: 407 | End: 2019-09-09
Attending: SURGERY | Admitting: SURGERY
Payer: MEDICARE

## 2019-09-09 ENCOUNTER — ANESTHESIA EVENT (OUTPATIENT)
Dept: SURGERY | Facility: MEDICAL CENTER | Age: 79
DRG: 407 | End: 2019-09-09
Payer: MEDICARE

## 2019-09-09 ENCOUNTER — ANESTHESIA (OUTPATIENT)
Dept: SURGERY | Facility: MEDICAL CENTER | Age: 79
DRG: 407 | End: 2019-09-09
Payer: MEDICARE

## 2019-09-09 VITALS
SYSTOLIC BLOOD PRESSURE: 145 MMHG | HEART RATE: 62 BPM | RESPIRATION RATE: 16 BRPM | WEIGHT: 147.05 LBS | TEMPERATURE: 97.6 F | HEIGHT: 63 IN | DIASTOLIC BLOOD PRESSURE: 64 MMHG | BODY MASS INDEX: 26.05 KG/M2 | OXYGEN SATURATION: 98 %

## 2019-09-09 DIAGNOSIS — R10.32 LEFT LOWER QUADRANT PAIN: ICD-10-CM

## 2019-09-09 DIAGNOSIS — K76.89 LIVER CYST: ICD-10-CM

## 2019-09-09 LAB
EKG IMPRESSION: NORMAL
PATHOLOGY CONSULT NOTE: NORMAL

## 2019-09-09 PROCEDURE — 160039 HCHG SURGERY MINUTES - EA ADDL 1 MIN LEVEL 3: Performed by: SURGERY

## 2019-09-09 PROCEDURE — A9270 NON-COVERED ITEM OR SERVICE: HCPCS | Performed by: ANESTHESIOLOGY

## 2019-09-09 PROCEDURE — 700101 HCHG RX REV CODE 250: Performed by: SURGERY

## 2019-09-09 PROCEDURE — 306637 HCHG MISC ORTHO ITEM RC 0274

## 2019-09-09 PROCEDURE — A6402 STERILE GAUZE <= 16 SQ IN: HCPCS | Performed by: SURGERY

## 2019-09-09 PROCEDURE — 700111 HCHG RX REV CODE 636 W/ 250 OVERRIDE (IP): Performed by: ANESTHESIOLOGY

## 2019-09-09 PROCEDURE — 500514 HCHG ENDOCLIP: Performed by: SURGERY

## 2019-09-09 PROCEDURE — 160035 HCHG PACU - 1ST 60 MINS PHASE I: Performed by: SURGERY

## 2019-09-09 PROCEDURE — 160025 RECOVERY II MINUTES (STATS): Performed by: SURGERY

## 2019-09-09 PROCEDURE — 160009 HCHG ANES TIME/MIN: Performed by: SURGERY

## 2019-09-09 PROCEDURE — 501838 HCHG SUTURE GENERAL: Performed by: SURGERY

## 2019-09-09 PROCEDURE — 160002 HCHG RECOVERY MINUTES (STAT): Performed by: SURGERY

## 2019-09-09 PROCEDURE — 501570 HCHG TROCAR, SEPARATOR: Performed by: SURGERY

## 2019-09-09 PROCEDURE — A9270 NON-COVERED ITEM OR SERVICE: HCPCS

## 2019-09-09 PROCEDURE — 160046 HCHG PACU - 1ST 60 MINS PHASE II: Performed by: SURGERY

## 2019-09-09 PROCEDURE — 93010 ELECTROCARDIOGRAM REPORT: CPT | Performed by: INTERNAL MEDICINE

## 2019-09-09 PROCEDURE — 501571 HCHG TROCAR, SEPARATOR 12X100: Performed by: SURGERY

## 2019-09-09 PROCEDURE — 501445 HCHG STAPLER, SKIN DISP: Performed by: SURGERY

## 2019-09-09 PROCEDURE — 160047 HCHG PACU  - EA ADDL 30 MINS PHASE II: Performed by: SURGERY

## 2019-09-09 PROCEDURE — 160048 HCHG OR STATISTICAL LEVEL 1-5: Performed by: SURGERY

## 2019-09-09 PROCEDURE — 160036 HCHG PACU - EA ADDL 30 MINS PHASE I: Performed by: SURGERY

## 2019-09-09 PROCEDURE — 700101 HCHG RX REV CODE 250: Performed by: ANESTHESIOLOGY

## 2019-09-09 PROCEDURE — A6404 STERILE GAUZE > 48 SQ IN: HCPCS | Performed by: SURGERY

## 2019-09-09 PROCEDURE — 700105 HCHG RX REV CODE 258: Performed by: SURGERY

## 2019-09-09 PROCEDURE — 160028 HCHG SURGERY MINUTES - 1ST 30 MINS LEVEL 3: Performed by: SURGERY

## 2019-09-09 PROCEDURE — 700102 HCHG RX REV CODE 250 W/ 637 OVERRIDE(OP): Performed by: ANESTHESIOLOGY

## 2019-09-09 PROCEDURE — 502571 HCHG PACK, LAP CHOLE: Performed by: SURGERY

## 2019-09-09 PROCEDURE — 7571 PR BACKGROUND TO BE CODED: Performed by: SURGERY

## 2019-09-09 PROCEDURE — 0FB04ZZ EXCISION OF LIVER, PERCUTANEOUS ENDOSCOPIC APPROACH: ICD-10-PCS | Performed by: SURGERY

## 2019-09-09 PROCEDURE — 88307 TISSUE EXAM BY PATHOLOGIST: CPT

## 2019-09-09 PROCEDURE — 47379 UNLISTED LAPS PX LIVER: CPT | Performed by: SURGERY

## 2019-09-09 PROCEDURE — 700102 HCHG RX REV CODE 250 W/ 637 OVERRIDE(OP)

## 2019-09-09 PROCEDURE — 93005 ELECTROCARDIOGRAM TRACING: CPT | Performed by: SURGERY

## 2019-09-09 RX ORDER — METOPROLOL TARTRATE 1 MG/ML
1 INJECTION, SOLUTION INTRAVENOUS
Status: DISCONTINUED | OUTPATIENT
Start: 2019-09-09 | End: 2019-09-09 | Stop reason: HOSPADM

## 2019-09-09 RX ORDER — ROCURONIUM BROMIDE 10 MG/ML
INJECTION, SOLUTION INTRAVENOUS PRN
Status: DISCONTINUED | OUTPATIENT
Start: 2019-09-09 | End: 2019-09-09 | Stop reason: SURG

## 2019-09-09 RX ORDER — SODIUM CHLORIDE, SODIUM LACTATE, POTASSIUM CHLORIDE, CALCIUM CHLORIDE 600; 310; 30; 20 MG/100ML; MG/100ML; MG/100ML; MG/100ML
INJECTION, SOLUTION INTRAVENOUS CONTINUOUS
Status: DISCONTINUED | OUTPATIENT
Start: 2019-09-09 | End: 2019-09-09 | Stop reason: HOSPADM

## 2019-09-09 RX ORDER — CEFAZOLIN SODIUM 1 G/3ML
INJECTION, POWDER, FOR SOLUTION INTRAMUSCULAR; INTRAVENOUS PRN
Status: DISCONTINUED | OUTPATIENT
Start: 2019-09-09 | End: 2019-09-09 | Stop reason: SURG

## 2019-09-09 RX ORDER — LABETALOL HYDROCHLORIDE 5 MG/ML
INJECTION, SOLUTION INTRAVENOUS PRN
Status: DISCONTINUED | OUTPATIENT
Start: 2019-09-09 | End: 2019-09-09 | Stop reason: SURG

## 2019-09-09 RX ORDER — MIDAZOLAM HYDROCHLORIDE 1 MG/ML
INJECTION INTRAMUSCULAR; INTRAVENOUS PRN
Status: DISCONTINUED | OUTPATIENT
Start: 2019-09-09 | End: 2019-09-09 | Stop reason: SURG

## 2019-09-09 RX ORDER — PROMETHAZINE HYDROCHLORIDE 25 MG/1
12.5 TABLET ORAL EVERY 6 HOURS PRN
Qty: 30 TAB | Refills: 0 | Status: SHIPPED | OUTPATIENT
Start: 2019-09-09 | End: 2020-09-11

## 2019-09-09 RX ORDER — OXYCODONE HYDROCHLORIDE AND ACETAMINOPHEN 5; 325 MG/1; MG/1
1 TABLET ORAL
Status: COMPLETED | OUTPATIENT
Start: 2019-09-09 | End: 2019-09-09

## 2019-09-09 RX ORDER — TRAMADOL HYDROCHLORIDE 50 MG/1
50-100 TABLET ORAL EVERY 4 HOURS PRN
Qty: 40 TAB | Refills: 0 | Status: SHIPPED | OUTPATIENT
Start: 2019-09-09 | End: 2019-09-19

## 2019-09-09 RX ORDER — LABETALOL HYDROCHLORIDE 5 MG/ML
5 INJECTION, SOLUTION INTRAVENOUS
Status: DISCONTINUED | OUTPATIENT
Start: 2019-09-09 | End: 2019-09-09 | Stop reason: HOSPADM

## 2019-09-09 RX ORDER — OXYCODONE HYDROCHLORIDE AND ACETAMINOPHEN 5; 325 MG/1; MG/1
2 TABLET ORAL
Status: COMPLETED | OUTPATIENT
Start: 2019-09-09 | End: 2019-09-09

## 2019-09-09 RX ORDER — BUPIVACAINE HYDROCHLORIDE AND EPINEPHRINE 5; 5 MG/ML; UG/ML
INJECTION, SOLUTION EPIDURAL; INTRACAUDAL; PERINEURAL
Status: DISCONTINUED | OUTPATIENT
Start: 2019-09-09 | End: 2019-09-09 | Stop reason: HOSPADM

## 2019-09-09 RX ORDER — DIPHENHYDRAMINE HYDROCHLORIDE 50 MG/ML
12.5 INJECTION INTRAMUSCULAR; INTRAVENOUS
Status: DISCONTINUED | OUTPATIENT
Start: 2019-09-09 | End: 2019-09-09 | Stop reason: HOSPADM

## 2019-09-09 RX ORDER — ONDANSETRON 2 MG/ML
INJECTION INTRAMUSCULAR; INTRAVENOUS PRN
Status: DISCONTINUED | OUTPATIENT
Start: 2019-09-09 | End: 2019-09-09 | Stop reason: SURG

## 2019-09-09 RX ORDER — HYDRALAZINE HYDROCHLORIDE 20 MG/ML
5 INJECTION INTRAMUSCULAR; INTRAVENOUS
Status: DISCONTINUED | OUTPATIENT
Start: 2019-09-09 | End: 2019-09-09 | Stop reason: HOSPADM

## 2019-09-09 RX ORDER — DEXAMETHASONE SODIUM PHOSPHATE 4 MG/ML
INJECTION, SOLUTION INTRA-ARTICULAR; INTRALESIONAL; INTRAMUSCULAR; INTRAVENOUS; SOFT TISSUE PRN
Status: DISCONTINUED | OUTPATIENT
Start: 2019-09-09 | End: 2019-09-09 | Stop reason: SURG

## 2019-09-09 RX ORDER — HALOPERIDOL 5 MG/ML
1 INJECTION INTRAMUSCULAR
Status: DISCONTINUED | OUTPATIENT
Start: 2019-09-09 | End: 2019-09-09 | Stop reason: HOSPADM

## 2019-09-09 RX ORDER — LOSARTAN POTASSIUM 50 MG/1
50 TABLET ORAL 2 TIMES DAILY
COMMUNITY

## 2019-09-09 RX ADMIN — FENTANYL CITRATE 50 MCG: 50 INJECTION, SOLUTION INTRAMUSCULAR; INTRAVENOUS at 08:55

## 2019-09-09 RX ADMIN — FENTANYL CITRATE 50 MCG: 50 INJECTION, SOLUTION INTRAMUSCULAR; INTRAVENOUS at 09:09

## 2019-09-09 RX ADMIN — MIDAZOLAM 1 MG: 1 INJECTION INTRAMUSCULAR; INTRAVENOUS at 07:08

## 2019-09-09 RX ADMIN — LABETALOL HYDROCHLORIDE 5 MG: 5 INJECTION, SOLUTION INTRAVENOUS at 07:52

## 2019-09-09 RX ADMIN — FENTANYL CITRATE 50 MCG: 50 INJECTION, SOLUTION INTRAMUSCULAR; INTRAVENOUS at 07:37

## 2019-09-09 RX ADMIN — FENTANYL CITRATE 25 MCG: 50 INJECTION, SOLUTION INTRAMUSCULAR; INTRAVENOUS at 08:21

## 2019-09-09 RX ADMIN — OXYCODONE HYDROCHLORIDE AND ACETAMINOPHEN 1 TABLET: 5; 325 TABLET ORAL at 08:21

## 2019-09-09 RX ADMIN — SODIUM CHLORIDE, POTASSIUM CHLORIDE, SODIUM LACTATE AND CALCIUM CHLORIDE: 600; 310; 30; 20 INJECTION, SOLUTION INTRAVENOUS at 06:21

## 2019-09-09 RX ADMIN — EPHEDRINE SULFATE 10 MG: 50 INJECTION INTRAMUSCULAR; INTRAVENOUS; SUBCUTANEOUS at 07:20

## 2019-09-09 RX ADMIN — ONDANSETRON 4 MG: 2 INJECTION INTRAMUSCULAR; INTRAVENOUS at 07:44

## 2019-09-09 RX ADMIN — PROPOFOL 100 MG: 10 INJECTION, EMULSION INTRAVENOUS at 07:10

## 2019-09-09 RX ADMIN — ROCURONIUM BROMIDE 40 MG: 10 INJECTION, SOLUTION INTRAVENOUS at 07:10

## 2019-09-09 RX ADMIN — OXYCODONE HYDROCHLORIDE AND ACETAMINOPHEN 1 TABLET: 5; 325 TABLET ORAL at 09:07

## 2019-09-09 RX ADMIN — FENTANYL CITRATE 100 MCG: 50 INJECTION, SOLUTION INTRAMUSCULAR; INTRAVENOUS at 07:10

## 2019-09-09 RX ADMIN — FENTANYL CITRATE 25 MCG: 50 INJECTION, SOLUTION INTRAMUSCULAR; INTRAVENOUS at 08:27

## 2019-09-09 RX ADMIN — CEFAZOLIN 2 G: 330 INJECTION, POWDER, FOR SOLUTION INTRAMUSCULAR; INTRAVENOUS at 07:08

## 2019-09-09 RX ADMIN — SUGAMMADEX 200 MG: 100 INJECTION, SOLUTION INTRAVENOUS at 07:58

## 2019-09-09 RX ADMIN — FENTANYL CITRATE 50 MCG: 50 INJECTION, SOLUTION INTRAMUSCULAR; INTRAVENOUS at 08:33

## 2019-09-09 RX ADMIN — FENTANYL CITRATE 50 MCG: 50 INJECTION, SOLUTION INTRAMUSCULAR; INTRAVENOUS at 07:51

## 2019-09-09 RX ADMIN — DEXAMETHASONE SODIUM PHOSPHATE 8 MG: 4 INJECTION, SOLUTION INTRA-ARTICULAR; INTRALESIONAL; INTRAMUSCULAR; INTRAVENOUS; SOFT TISSUE at 07:09

## 2019-09-09 ASSESSMENT — PAIN SCALES - GENERAL: PAIN_LEVEL: 0

## 2019-09-09 NOTE — PROGRESS NOTES
Med rec partially complete per pt at bedside  Pt doesn't remember all her supplements    Allergies reviewed and updated.

## 2019-09-09 NOTE — DISCHARGE INSTRUCTIONS
ACTIVITY: Rest and take it easy for the first 24 hours.  A responsible adult is recommended to remain with you during that time.  It is normal to feel sleepy.  We encourage you to not do anything that requires balance, judgment or coordination.    MILD FLU-LIKE SYMPTOMS ARE NORMAL. YOU MAY EXPERIENCE GENERALIZED MUSCLE ACHES, THROAT IRRITATION, HEADACHE AND/OR SOME NAUSEA.    FOR 24 HOURS DO NOT:  Drive, operate machinery or run household appliances.  Drink beer or alcoholic beverages.   Make important decisions or sign legal documents.    SPECIAL INSTRUCTIONS:   OK to shower in the morning with dressings on  Remove dressings in 6 days  Follow up with DR. Aquino in 4 weeks after   Follow up with office Medical Assistant in 14 days for staple removal  NN ASPIRIN, ADVIL OR IBUPROFEN FOR 1 WEEK      DIET: To avoid nausea, slowly advance diet as tolerated, avoiding spicy or greasy foods for the first day.  Add more substantial food to your diet according to your physician's instructions.  Babies can be fed formula or breast milk as soon as they are hungry.  INCREASE FLUIDS AND FIBER TO AVOID CONSTIPATION.    SURGICAL DRESSING/BATHING: OK to shower in the morning with dressings on  Remove dressings in 6 days    FOLLOW-UP APPOINTMENT:  A follow-up appointment should be arranged with your doctor in 2 weeks, call to schedule.    You should CALL YOUR PHYSICIAN if you develop:  Fever greater than 101 degrees F.  Pain not relieved by medication, or persistent nausea or vomiting.  Excessive bleeding (blood soaking through dressing) or unexpected drainage from the wound.  Extreme redness or swelling around the incision site, drainage of pus or foul smelling drainage.  Inability to urinate or empty your bladder within 8 hours.  Problems with breathing or chest pain.    You should call 911 if you develop problems with breathing or chest pain.  If you are unable to contact your doctor or surgical center, you should go to  the nearest emergency room or urgent care center.  Physician's telephone #: [phone]    If any questions arise, call your doctor.  If your doctor is not available, please feel free to call the Surgical Center at [phone].  The Center is open Monday through Friday from 7AM to 7PM.  You can also call the HEALTH HOTLINE open 24 hours/day, 7 days/week and speak to a nurse at [phone], or toll free at [phone].    A registered nurse may call you a few days after your surgery to see how you are doing after your procedure.    MEDICATIONS: Resume taking daily medication.  Take prescribed pain medication with food.  If no medication is prescribed, you may take non-aspirin pain medication if needed.  PAIN MEDICATION CAN BE VERY CONSTIPATING.  Take a stool softener or laxative such as senokot, pericolace, or milk of magnesia if needed.    Prescription given for tramadol and phenergan  Last pain medication given at 09:07 am    If your physician has prescribed pain medication that includes Acetaminophen (Tylenol), do not take additional Acetaminophen (Tylenol) while taking the prescribed medication.    Depression / Suicide Risk    As you are discharged from this [facility] facility, it is important to learn how to keep safe from harming yourself.    Recognize the warning signs:  · Abrupt changes in personality, positive or negative- including increase in energy   · Giving away possessions  · Change in eating patterns- significant weight changes-  positive or negative  · Change in sleeping patterns- unable to sleep or sleeping all the time   · Unwillingness or inability to communicate  · Depression  · Unusual sadness, discouragement and loneliness  · Talk of wanting to die  · Neglect of personal appearance   · Rebelliousness- reckless behavior  · Withdrawal from people/activities they love  · Confusion- inability to concentrate     If you or a loved one observes any of these behaviors or has concerns about  self-harm, here's what you can do:  · Talk about it- your feelings and reasons for harming yourself  · Remove any means that you might use to hurt yourself (examples: pills, rope, extension cords, firearm)  · Get professional help from the community (Mental Health, Substance Abuse, psychological counseling)  · Do not be alone:Call your Safe Contact- someone whom you trust who will be there for you.  · Call your local CRISIS HOTLINE 467-4347 or 726-707-7906  · Call your local Children's Mobile Crisis Response Team [place] (903) 763-5897 or www.[employer]  · Call the toll free National Suicide Prevention Hotlines   · National Suicide Prevention Lifeline 085-808-CHVS (8216)  · National Hope Line Network 800-SUICIDE (282-8551)

## 2019-09-09 NOTE — ANESTHESIA TIME REPORT
Anesthesia Start and Stop Event Times     Date Time Event    9/9/2019 0643 Ready for Procedure     0708 Anesthesia Start     0809 Anesthesia Stop        Responsible Staff  09/09/19    Name Role Begin End    Rafy Birch D.O. Anesth 0708 0809        Preop Diagnosis (Free Text):  Pre-op Diagnosis     LIVER CYST        Preop Diagnosis (Codes):    Post op Diagnosis  Liver cyst      Premium Reason  Non-Premium    Comments:

## 2019-09-09 NOTE — PROGRESS NOTES
Pre-op complete. Patient updated on plan of care. All questions answered at this time.  Call light within reach, advised to call for any questions or concerns. Hourly rounding in place.

## 2019-09-09 NOTE — OP REPORT
DATE OF SERVICE:  09/09/2019    INDICATIONS:  The patient is an 79-year-old female patient known to me who was   seen in the hospital approximately 2 months ago regarding a painful enlarged   liver cyst in segment 4B.  The patient was drained by interventional radiology   at that time because of the concern for infection, but also significant pain   and disability affecting her performance status.  She was discharged home, but   then returned complaining of pain again.  She was reimaged and found that the   liver cyst had shrunk a little bit after it drained, but it had come back and   it was approximately 9-10 cm in size and causing discomfort.  After a long   discussion, the patient elected to proceed with marsupialization.    SURGEON:  Vinny Aquino MD    ASSISTANT:  None.    ANESTHESIOLOGIST:  Rafy Birch DO    ANESTHESIA:  General and local anesthetic.    ESTIMATED BLOOD LOSS:  Less than 5 mL.    COMPLICATIONS:  None.    FINDINGS:  The patient did have a large cyst measuring approximately 9-10 cm   seen with imaging extending from segment 4B across the falciform and along the   left portal pedicle into segment 3.  No sign of biliary fistula or bile   spillage after marsupialization.    PROCEDURES DONE:  1.  Marsupialization of massive liver cyst.  2.  Liver wedge biopsy of segment 4B.  3.  Intraoperative ultrasound survey of the liver to assess other cysts and   also margins of present cyst.    DESCRIPTION OF PROCEDURE:  The patient was brought to OR, placed under general   anesthetic, prepped with chlorhexidine prep on her abdomen with both arms out   under general anesthetic, covered with sterile drapes, given preoperative   antibiotics.  Timeout was done, which was adequate.  At that point, an   incision was made with an #11 blade along the inferior margin of the umbilicus   and between 2 Kochers and Ama, the abdomen was opened under direct vision   atraumatically.  A 12 mm trocar was  inserted.  Abdomen was insufflated to 15   mm of pressure.  She was placed in reverse Trendelenburg, exposing all her   right upper quadrant and left upper quadrant.  She was noted to have some   adhesions of her omentum to the falciform ligament.  Immediately, a 5 mm port   was placed percutaneously under direct vision atraumatically in the right   upper quadrant.  These adhesions were taken down freeing up the falciform   ligament.  At that point, we could evaluate the extent of the cyst and it did   cross over to the left lateral segment.  We then placed a second 5 mm port   percutaneously under direct vision atraumatically in the left upper quadrant   and then using the Thunderbeat, we incised the cyst wall.  As of note, the   cyst did go up to the medial aspect of the gallbladder in segment 4B.  Care   was taken to leave approximately a cm of cyst wall against the gallbladder.    We continued this dissection and resection along the margins of the cyst.  As   of note, there were areas that appeared thickened, more inflammatory than   worrisome for any malignancy.  We continued this dissection across segment 4B   into the left lateral segment and then peeled it off of the portal pedicle   along the falciform.  At that point, it was pulled out through the umbilicus.    Again, if it through the trocar, we did not use a bag.  We reinspected.    There were some areas of thickened liver.  We placed multiple clips on and   also used a bipolar from the Thunderbeat and we did take a little piece of the   segment 4B liver in order to send off to pathology along with the cyst wall   for evaluation in a wedge resection fashion using the Thunderbeat.  Once   completed, we reinspected, showing no bleeding or bile leak.  We desufflated   the abdomen, waited approximately 5 minutes by the clock in the supine   position and reinflated her and there was no sign of bile leak or bleeding.    Again, the edges of the cyst wall were  coagulated using bipolar from the   Thunderbeat as well as the sealing device aspect of the Thunderbeat.  Once   completed, there was no sign of bile leak or bleeding.  At that point, the   operation was completed.  We desufflated the abdomen completely.  There was no   sign of ischemia of the gallbladder and no sign of perforation of the bowel.    The preplaced #0 Vicryl suture in the umbilicus was tied.  She was given a   total of 30 mL of 0.5% Marcaine with epinephrine throughout the area of the   soft tissue and then skin with staples, 2x2, Tegaderms, extubated, and   transferred to recovery.       ____________________________________     MD MARCOS Fitzgerald / NTS    DD:  09/09/2019 08:11:32  DT:  09/09/2019 09:01:31    D#:  [id number]  Job#:  [id number]

## 2019-09-09 NOTE — ANESTHESIA POSTPROCEDURE EVALUATION
Patient: Catherine Huitron    Procedure Summary     Date:  09/09/19 Room / Location:  [place] [address] / SURGERY [facility]    Anesthesia Start:  0708 Anesthesia Stop:  0809    Procedure:  BIOPSY, LIVER, LAPAROSCOPIC - FOR LIVER CYSTECTOMY, MARSUPIALIZATION (Abdomen) Diagnosis:  (LIVER CYST)    Surgeon:  Vinny Aquino M.D. Responsible Provider:  Rafy Birch D.O.    Anesthesia Type:  general ASA Status:  2          Final Anesthesia Type: general  Last vitals  BP   Blood Pressure : (!) 184/64    Temp   36.4 °C (97.6 °F)    Pulse   Pulse: 74   Resp   18    SpO2   96 %      Anesthesia Post Evaluation    Patient location during evaluation: PACU  Patient participation: complete - patient participated  Level of consciousness: awake and alert  Pain score: 0    Airway patency: patent  Anesthetic complications: no  Cardiovascular status: hemodynamically stable  Respiratory status: acceptable  Hydration status: euvolemic    PONV: none           Nurse Pain Score: 0 (NPRS)

## 2019-09-09 NOTE — ANESTHESIA QCDR
2019 [facility] Clinical Data Registry (for Quality Improvement)     Postoperative nausea/vomiting risk protocol (Adult = 18 yrs and Pediatric 3-17 yrs)- (430 and 463)  General inhalation anesthetic (NOT TIVA) with PONV risk factors: Yes  Provision of anti-emetic therapy with at least 2 different classes of agents: Yes   Patient DID NOT receive anti-emetic therapy and reason is documented in Medical Record:  N/A    Multimodal Pain Management- (AQI59)  Patient undergoing Elective Surgery (i.e. Outpatient, or ASC, or Prescheduled Surgery prior to Hospital Admission): Yes  Use of Multimodal Pain Management, two or more drugs and/or interventions, NOT including systemic opioids: Yes   Exception: Documented allergy to multiple classes of analgesics:  N/A    PACU assessment of acute postoperative pain prior to Anesthesia Care End- Applies to Patients Age = 18- (ABG7)  Initial PACU pain score is which of the following: < 7/10  Patient unable to report pain score: N/A    Post-anesthetic transfer of care checklist/protocol to PACU/ICU- (426 and 427)  Upon conclusion of case, patient transferred to which of the following locations: PACU/Non-ICU  Use of transfer checklist/protocol: Yes  Exclusion: Service Performed in Patient Hospital Room (and thus did not require transfer): N/A    PACU Reintubation- (AQI31)  General anesthesia requiring endotracheal intubation (ETT) along with subsequent extubation in OR or PACU: Yes  Required reintubation in the PACU: No   Extubation was a planned trial documented in the medical record prior to removal of the original airway device:  N/A    Unplanned admission to ICU related to anesthesia service up through end of PACU care- (MD51)  Unplanned admission to ICU (not initially anticipated at anesthesia start time): No

## 2019-09-09 NOTE — ANESTHESIA PREPROCEDURE EVALUATION
Relevant Problems   CARDIAC   (+) Essential (primary) hypertension         (+) Liver cyst       Physical Exam    Airway   Mallampati: II  TM distance: >3 FB  Neck ROM: full       Cardiovascular - normal exam  Rhythm: regular  Rate: normal  (-) murmur     Dental - normal exam         Pulmonary - normal exam  Breath sounds clear to auscultation     Abdominal    Neurological - normal exam                 Anesthesia Plan    ASA 2       Plan - general       Airway plan will be ETT        Induction: intravenous    Postoperative Plan: Postoperative administration of opioids is intended.    Pertinent diagnostic labs and testing reviewed    Informed Consent:    Anesthetic plan and risks discussed with patient.    Use of blood products discussed with: patient whom consented to blood products.

## 2019-09-26 ENCOUNTER — NON-PROVIDER VISIT (OUTPATIENT)
Dept: SURGERY | Facility: MEDICAL CENTER | Age: 79
End: 2019-09-26
Payer: MEDICARE

## 2019-09-26 NOTE — NON-PROVIDER
Catherine Huitron is here for a Non-Provider Visit for Staple Removal.     Staple were placed by Dr. Aquino on date: 9/9/2019  Skin is healed: Yes  Provider notified if skin is not healed, or if there is redness, heat, pain, or drainage from incision: Yes  Staple removed.   Steristips are placed: Yes     Advised to keep clean and dry, wash with anti-bacterial soap and water, avoid any creams or lotions.

## 2019-10-04 NOTE — PROGRESS NOTES
"There is no reason to reimage her only if she develops clinical symptoms.  She states she has had a dramatic improvement in her performance status.Subjective:      Primary care physician:Gregory Munoz D.O.      Chief Complaint: No chief complaint on file.    Diagnosis:   1. Liver cyst     2. Left lower quadrant abdominal pain     3. Abnormal CT of the abdomen       I, Dr. Aquino have entered, reviewed and confirmed the above diagnoses related to this patient on this date of service, 10/8/2019.    History of presenting illness:  Catherine Huitron  is a pleasant 79 y.o. year old female who presented with postop visit status post laparoscopic marsupialization.  Patient denies any fever or chills, nausea or vomiting.  She states that she has improved dramatically since the surgery.  She is very happy with the results.  Her pathology revealed just a benign cyst.      Past Medical History:   Diagnosis Date   • Chronic venous insufficiency 2018   • Dental disorder     upper dentures, lower implants   • Heart valve disease 08/2019    'leaky heart valve'   • Hypertension      Past Surgical History:   Procedure Laterality Date   • NM LAP,BILIARY TRACT,UNLISTED  9/9/2019    Procedure: BIOPSY, LIVER, LAPAROSCOPIC - FOR LIVER CYSTECTOMY, MARSUPIALIZATION;  Surgeon: Vinny Aquino M.D.;  Location: SURGERY [facility];  Service: General   • SCLEROTHERAPHY Right 10/2018     Allergies   Allergen Reactions   • Lisinopril      \"puffy\" and cough.  Possible angioedema     Outpatient Encounter Medications as of 10/8/2019   Medication Sig Dispense Refill   • losartan (COZAAR) 50 MG Tab Take 75 mg by mouth 2 Times a Day.     • NON SPECIFIED Unknown supplements   Butchers broom, vitamin e, and metabolism booster     • promethazine (PHENERGAN) 25 MG Tab Take 0.5 Tabs by mouth every 6 hours as needed. 30 Tab 0   • hydrALAZINE (APRESOLINE) 25 MG Tab Take 1 Tab by mouth 4 times a day as needed (SBP> 160). 90 Tab 0     No " facility-administered encounter medications on file as of 10/8/2019.      Social History     Socioeconomic History   • Marital status:      Spouse name: Not on file   • Number of children: Not on file   • Years of education: Not on file   • Highest education level: Not on file   Occupational History   • Not on file   Social Needs   • Financial resource strain: Not on file   • Food insecurity:     Worry: Not on file     Inability: Not on file   • Transportation needs:     Medical: Not on file     Non-medical: Not on file   Tobacco Use   • Smoking status: Former Smoker   • Smokeless tobacco: Never Used   Substance and Sexual Activity   • Alcohol use: No   • Drug use: No   • Sexual activity: Not on file   Lifestyle   • Physical activity:     Days per week: Not on file     Minutes per session: Not on file   • Stress: Not on file   Relationships   • Social connections:     Talks on phone: Not on file     Gets together: Not on file     Attends Zoroastrian service: Not on file     Active member of club or organization: Not on file     Attends meetings of clubs or organizations: Not on file     Relationship status: Not on file   • Intimate partner violence:     Fear of current or ex partner: Not on file     Emotionally abused: Not on file     Physically abused: Not on file     Forced sexual activity: Not on file   Other Topics Concern   • Not on file   Social History Narrative   • Not on file      Social History     Tobacco Use   Smoking Status Former Smoker   Smokeless Tobacco Never Used     Social History     Substance and Sexual Activity   Alcohol Use No     Social History     Substance and Sexual Activity   Drug Use No        Family History   Problem Relation Age of Onset   • Cancer Father        Review of Systems   Constitutional: Positive for weight loss.   HENT: Positive for hearing loss.    Psychiatric/Behavioral:        Restless sleep        Objective:   There were no vitals taken for this visit.    Physical  Exam   Pulmonary/Chest: Effort normal and breath sounds normal.   Abdominal: Soft. Bowel sounds are normal.   Incisions are clean, dry, and intact.   Nursing note and vitals reviewed.      Labs:  Results for LENA VOGEL (MRN [id number]) as of 10/4/2019 08:06   Ref. Range 8/27/2019 13:17   WBC Latest Ref Range: 4.8 - 10.8 K/uL 8.0   RBC Latest Ref Range: 4.20 - 5.40 M/uL 4.46   Hemoglobin Latest Ref Range: 12.0 - 16.0 g/dL 14.6   Hematocrit Latest Ref Range: 37.0 - 47.0 % 43.7   MCV Latest Ref Range: 81.4 - 97.8 fL 98.0 (H)   MCH Latest Ref Range: 27.0 - 33.0 pg 32.7   MCHC Latest Ref Range: 33.6 - 35.0 g/dL 33.4 (L)   RDW Latest Ref Range: 35.9 - 50.0 fL 44.1   Platelet Count Latest Ref Range: 164 - 446 K/uL 239   MPV Latest Ref Range: 9.0 - 12.9 fL 9.8   Neutrophils-Polys Latest Ref Range: 44.00 - 72.00 % 67.70   Neutrophils (Absolute) Latest Ref Range: 2.00 - 7.15 K/uL 5.41   Lymphocytes Latest Ref Range: 22.00 - 41.00 % 21.80 (L)   Lymphs (Absolute) Latest Ref Range: 1.00 - 4.80 K/uL 1.75   Monocytes Latest Ref Range: 0.00 - 13.40 % 8.90   Monos (Absolute) Latest Ref Range: 0.00 - 0.85 K/uL 0.71   Eosinophils Latest Ref Range: 0.00 - 6.90 % 0.70   Eos (Absolute) Latest Ref Range: 0.00 - 0.51 K/uL 0.06   Basophils Latest Ref Range: 0.00 - 1.80 % 0.70   Baso (Absolute) Latest Ref Range: 0.00 - 0.12 K/uL 0.06   Immature Granulocytes Latest Ref Range: 0.00 - 0.90 % 0.20   Immature Granulocytes (abs) Latest Ref Range: 0.00 - 0.11 K/uL 0.02   Nucleated RBC Latest Units: /100 WBC 0.00   NRBC (Absolute) Latest Units: K/uL 0.00   Sodium Latest Ref Range: 135 - 145 mmol/L 136   Potassium Latest Ref Range: 3.6 - 5.5 mmol/L 4.0   Chloride Latest Ref Range: 96 - 112 mmol/L 103   Co2 Latest Ref Range: 20 - 33 mmol/L 24   Anion Gap Latest Ref Range: 0.0 - 11.9  9.0   Glucose Latest Ref Range: 65 - 99 mg/dL 94   Bun Latest Ref Range: 8 - 22 mg/dL 14   Creatinine Latest Ref Range: 0.50 - 1.40 mg/dL 0.74   GFR If African  American Latest Ref Range: >60 mL/min/1.73 m 2 >60   GFR If Non  Latest Ref Range: >60 mL/min/1.73 m 2 >60   Calcium Latest Ref Range: 8.5 - 10.5 mg/dL 10.0   AST(SGOT) Latest Ref Range: 12 - 45 U/L 17   ALT(SGPT) Latest Ref Range: 2 - 50 U/L 16   Alkaline Phosphatase Latest Ref Range: 30 - 99 U/L 116 (H)   Total Bilirubin Latest Ref Range: 0.1 - 1.5 mg/dL 0.4   Albumin Latest Ref Range: 3.2 - 4.9 g/dL 4.4   Total Protein Latest Ref Range: 6.0 - 8.2 g/dL 7.2   Globulin Latest Ref Range: 1.9 - 3.5 g/dL 2.8   A-G Ratio Latest Units: g/dL 1.6   Lipase Latest Ref Range: 11 - 82 U/L 27   Troponin T Latest Ref Range: 6 - 19 ng/L 11       Imaging:  Per my read, 8/23/19 CT        Pathology: 9/9/19    FINAL DIAGNOSIS:    A. Liver cyst wall:         Benign cyst wall lined by cuboidal to flattened single layer of          bland cells and composed of fibrous tissue, blood vessels, mild          chronic inflammatory infiltrate and calcifications.  Scattered          bile ducts are noted along with focal areas of benign liver          parenchyma.         No malignancy identified.    Procedures: 9/9/19    1.  Marsupialization of massive liver cyst.  2.  Liver wedge biopsy of segment 4B.  3.  Intraoperative ultrasound survey of the liver to assess other cysts and   also margins of present cyst.       Diagnosis:     1. Liver cyst     2. Left lower quadrant abdominal pain     3. Abnormal CT of the abdomen         IDr. Aquino have entered, reviewed and confirmed the above diagnoses related to this patient on this date of service, 10/8/2019.    Medical Decision Making:  Today's Assessment / Status / Plan:     In light of the present findings, the patient is done extremely well.  I instructed the patient that there is no need to reimage her only if she develops clinical symptoms.  She is being discharged from my clinic will follow-up as needed.    She agreed and verbalized her agreement and understanding with the  current plan. I answered all questions and concerns she has at this time and advised her to call at any time in the interim with questions or concerns in regards to her care.    Thank you for allowing me to participate in her care, I will continue to follow closely.       Please note that this dictation was created using voice recognition software. I have made every reasonable attempt to correct obvious errors, but I expect that there are errors of grammar and possibly content that I did not discover before finalizing the note.     Thank you for this consultation and allowing me to participate in your patient's care. If I can be of further service please contact my office.

## 2019-10-08 ENCOUNTER — OFFICE VISIT (OUTPATIENT)
Dept: SURGERY | Facility: MEDICAL CENTER | Age: 79
End: 2019-10-08
Payer: MEDICARE

## 2019-10-08 VITALS
DIASTOLIC BLOOD PRESSURE: 60 MMHG | TEMPERATURE: 98.2 F | OXYGEN SATURATION: 92 % | SYSTOLIC BLOOD PRESSURE: 112 MMHG | HEIGHT: 63 IN | WEIGHT: 146.61 LBS | BODY MASS INDEX: 25.98 KG/M2 | HEART RATE: 73 BPM

## 2019-10-08 DIAGNOSIS — R10.32 LEFT LOWER QUADRANT ABDOMINAL PAIN: ICD-10-CM

## 2019-10-08 DIAGNOSIS — K76.89 LIVER CYST: ICD-10-CM

## 2019-10-08 DIAGNOSIS — R93.5 ABNORMAL CT OF THE ABDOMEN: ICD-10-CM

## 2019-10-08 PROCEDURE — 99024 POSTOP FOLLOW-UP VISIT: CPT | Performed by: SURGERY

## 2019-10-08 ASSESSMENT — ENCOUNTER SYMPTOMS: WEIGHT LOSS: 1

## 2020-01-16 ENCOUNTER — OFFICE VISIT (OUTPATIENT)
Dept: URGENT CARE | Facility: PHYSICIAN GROUP | Age: 80
End: 2020-01-16
Payer: MEDICARE

## 2020-01-16 VITALS
SYSTOLIC BLOOD PRESSURE: 136 MMHG | RESPIRATION RATE: 16 BRPM | TEMPERATURE: 97.5 F | HEIGHT: 63 IN | HEART RATE: 72 BPM | BODY MASS INDEX: 27.46 KG/M2 | OXYGEN SATURATION: 95 % | WEIGHT: 155 LBS | DIASTOLIC BLOOD PRESSURE: 62 MMHG

## 2020-01-16 DIAGNOSIS — B02.9 HERPES ZOSTER WITHOUT COMPLICATION: ICD-10-CM

## 2020-01-16 PROBLEM — I87.8 VENOUS STASIS: Status: ACTIVE | Noted: 2019-03-19

## 2020-01-16 PROBLEM — I65.29 CAROTID ARTERY STENOSIS: Status: ACTIVE | Noted: 2018-11-20

## 2020-01-16 PROBLEM — D72.829 LEUKOCYTOSIS: Status: ACTIVE | Noted: 2019-08-01

## 2020-01-16 PROBLEM — I10 BENIGN ESSENTIAL HYPERTENSION: Status: ACTIVE | Noted: 2018-11-20

## 2020-01-16 PROCEDURE — 99214 OFFICE O/P EST MOD 30 MIN: CPT | Performed by: PHYSICIAN ASSISTANT

## 2020-01-16 RX ORDER — LOSARTAN POTASSIUM 50 MG/1
TABLET ORAL
COMMUNITY
End: 2020-02-25

## 2020-01-16 RX ORDER — HYDRALAZINE HYDROCHLORIDE 25 MG/1
TABLET, FILM COATED ORAL
COMMUNITY

## 2020-01-16 RX ORDER — LOSARTAN POTASSIUM 100 MG/1
TABLET ORAL
COMMUNITY
End: 2020-02-25

## 2020-01-16 RX ORDER — TRAMADOL HYDROCHLORIDE 50 MG/1
TABLET ORAL
COMMUNITY
End: 2020-02-25

## 2020-01-16 RX ORDER — CLONIDINE 0.1 MG/24H
PATCH, EXTENDED RELEASE TRANSDERMAL
COMMUNITY
End: 2020-02-25

## 2020-01-16 RX ORDER — PROPRANOLOL HYDROCHLORIDE 10 MG/1
TABLET ORAL
COMMUNITY
Start: 2020-01-09

## 2020-01-16 RX ORDER — METHYLPREDNISOLONE 4 MG/1
4 TABLET ORAL SEE ADMIN INSTRUCTIONS
Qty: 21 TAB | Refills: 0 | Status: SHIPPED
Start: 2020-01-16 | End: 2020-02-25

## 2020-01-16 RX ORDER — VALACYCLOVIR HYDROCHLORIDE 1 G/1
1000 TABLET, FILM COATED ORAL 3 TIMES DAILY
Qty: 21 TAB | Refills: 0 | Status: SHIPPED | OUTPATIENT
Start: 2020-01-16 | End: 2020-01-23

## 2020-01-16 NOTE — PROGRESS NOTES
Chief Complaint   Patient presents with   • Eczema       HISTORY OF PRESENT ILLNESS: Patient is a 79 y.o. female who presents today because she has a rash underneath her right axillary region and she thinks it might be eczema.  It started about 2 weeks ago.  Has a burning aching sensation    Patient Active Problem List    Diagnosis Date Noted   • Liver cyst 07/23/2019     Priority: High   • Benign essential hypertension 11/20/2018     Priority: Medium   • Abdominal pain 08/20/2019   • Abnormal CT of the abdomen 08/20/2019   • Leukocytosis 08/01/2019   • Contraindication to deep vein thrombosis (DVT) prophylaxis 07/24/2019   • Venous stasis 03/19/2019   • Carotid artery stenosis 11/20/2018       Allergies:Lisinopril    Current Outpatient Medications Ordered in Epic   Medication Sig Dispense Refill   • PROPRANOLOL HCL PO Take  by mouth.     • valacyclovir (VALTREX) 1 GM Tab Take 1 Tab by mouth 3 times a day for 7 days. 21 Tab 0   • methylPREDNISolone (MEDROL DOSEPAK) 4 MG Tablet Therapy Pack Take 1 Tab by mouth See Admin Instructions. 21 Tab 0   • losartan (COZAAR) 50 MG Tab Take 75 mg by mouth 2 Times a Day.     • hydrALAZINE (APRESOLINE) 25 MG Tab Take 1 Tab by mouth 4 times a day as needed (SBP> 160). 90 Tab 0   • propranolol (INDERAL) 10 MG Tab      • losartan (COZAAR) 50 MG Tab losartan 50 mg tablet   Take 1 tablet every day by oral route.     • losartan (COZAAR) 100 MG Tab losartan 100 mg tablet   Take 1 tablet every day by oral route for 90 days.     • hydrALAZINE (APRESOLINE) 25 MG Tab hydralazine 25 mg tablet   Take 1 tablet twice a day by oral route for 90 days.   Take when blood pressure is over 160     • tramadol (ULTRAM) 50 MG Tab tramadol 50 mg tablet   TAKE ONE TABLET BY MOUTH EVERY TWELVE HOURS AS NEEDED FOR 30 DAYS     • cloNIDine (CATAPRES) 0.1 MG/24HR PATCH WEEKLY patch clonidine 0.1 mg/24 hr weekly transdermal patch   Apply 1 patch every week by transdermal route.     • NON SPECIFIED Unknown  "supplements   Butchers broom, vitamin e, and metabolism booster     • promethazine (PHENERGAN) 25 MG Tab Take 0.5 Tabs by mouth every 6 hours as needed. 30 Tab 0     No current Epic-ordered facility-administered medications on file.        Past Medical History:   Diagnosis Date   • Chronic venous insufficiency    • Dental disorder     upper dentures, lower implants   • Heart valve disease 2019    'leaky heart valve'   • Hypertension        Social History     Tobacco Use   • Smoking status: Former Smoker     Packs/day: 0.00   • Smokeless tobacco: Never Used   Substance Use Topics   • Alcohol use: No   • Drug use: No       Family Status   Relation Name Status   • Fa Lung cancer, at 71 (Not Specified)     Family History   Problem Relation Age of Onset   • Cancer Father        ROS:  Review of Systems   Constitutional: Negative for fever, chills, weight loss and malaise/fatigue.   HENT: Negative for ear pain, nosebleeds, congestion, sore throat and neck pain.    Eyes: Negative for blurred vision.   Respiratory: Negative for cough, sputum production, shortness of breath and wheezing.    Cardiovascular: Negative for chest pain, palpitations, orthopnea and leg swelling.   Gastrointestinal: Negative for heartburn, nausea, vomiting and abdominal pain.   Genitourinary: Negative for dysuria, urgency and frequency.     Exam:  /62 (BP Location: Right arm, Patient Position: Sitting, BP Cuff Size: Adult)   Pulse 72   Temp 36.4 °C (97.5 °F) (Temporal)   Resp 16   Ht 1.6 m (5' 3\")   Wt 70.3 kg (155 lb)   SpO2 95%   General:  Well nourished, well developed female in NAD  Head:Normocephalic, atraumatic  Eyes: PERRLA, EOM within normal limits, no conjunctival injection, no scleral icterus, visual fields and acuity grossly intact.  Nose: Symmetrical without tenderness, no discharge.  Mouth: reasonable hygiene, no erythema exudates or tonsillar enlargement.  Neck: no masses, range of motion within normal limits, no " tracheal deviation. No obvious thyroid enlargement.  Extremities: no clubbing, cyanosis, or edema.  Skin: In a dermatomal fashion starting in her upper back she has various maculopapular pustular lesions measuring from 1 cm to 3 cm in diameter.  She has similar lesions in her axillary region and across her right pectoral region.    Please note that this dictation was created using voice recognition software. I have made every reasonable attempt to correct obvious errors, but I expect that there are errors of grammar and possibly content that I did not discover before finalizing the note.    Assessment/Plan:  1. Herpes zoster without complication  valacyclovir (VALTREX) 1 GM Tab    methylPREDNISolone (MEDROL DOSEPAK) 4 MG Tablet Therapy Pack   Recommended over-the-counter Caladryl    Followup with primary care in the next 7-10 days if not significantly improving, return to the urgent care or go to the emergency room sooner for any worsening of symptoms.

## 2020-01-20 ENCOUNTER — OFFICE VISIT (OUTPATIENT)
Dept: URGENT CARE | Facility: PHYSICIAN GROUP | Age: 80
End: 2020-01-20
Payer: MEDICARE

## 2020-01-20 VITALS
BODY MASS INDEX: 27.46 KG/M2 | OXYGEN SATURATION: 95 % | DIASTOLIC BLOOD PRESSURE: 64 MMHG | TEMPERATURE: 97.1 F | HEART RATE: 68 BPM | WEIGHT: 155 LBS | SYSTOLIC BLOOD PRESSURE: 138 MMHG | HEIGHT: 63 IN | RESPIRATION RATE: 14 BRPM

## 2020-01-20 DIAGNOSIS — B02.9 HERPES ZOSTER WITHOUT COMPLICATION: ICD-10-CM

## 2020-01-20 DIAGNOSIS — B02.29 POST HERPETIC NEURALGIA: ICD-10-CM

## 2020-01-20 PROCEDURE — 99214 OFFICE O/P EST MOD 30 MIN: CPT | Mod: 25 | Performed by: PHYSICIAN ASSISTANT

## 2020-01-20 RX ORDER — AMLODIPINE BESYLATE 5 MG/1
TABLET ORAL
COMMUNITY
Start: 2020-01-18 | End: 2020-02-25

## 2020-01-20 RX ORDER — PREDNISONE 10 MG/1
TABLET ORAL
Qty: 30 TAB | Refills: 0 | Status: SHIPPED | OUTPATIENT
Start: 2020-01-20 | End: 2020-02-25

## 2020-01-20 RX ORDER — KETOROLAC TROMETHAMINE 30 MG/ML
30 INJECTION, SOLUTION INTRAMUSCULAR; INTRAVENOUS ONCE
Status: COMPLETED | OUTPATIENT
Start: 2020-01-20 | End: 2020-01-20

## 2020-01-20 RX ADMIN — KETOROLAC TROMETHAMINE 30 MG: 30 INJECTION, SOLUTION INTRAMUSCULAR; INTRAVENOUS at 10:52

## 2020-01-20 NOTE — PROGRESS NOTES
Chief Complaint   Patient presents with   • Herpes Zoster       HISTORY OF PRESENT ILLNESS: Patient is a 79 y.o. female who presents today because she was diagnosed with herpes zoster 4 days ago, she has been on Valtrex and a Medrol Dosepak and has been using over-the-counter Caladryl.  She was getting some relief on the steroid until the last day or so when she is down to 1 or 2 pills a day.    Patient Active Problem List    Diagnosis Date Noted   • Liver cyst 07/23/2019     Priority: High   • Benign essential hypertension 11/20/2018     Priority: Medium   • Abdominal pain 08/20/2019   • Abnormal CT of the abdomen 08/20/2019   • Leukocytosis 08/01/2019   • Contraindication to deep vein thrombosis (DVT) prophylaxis 07/24/2019   • Venous stasis 03/19/2019   • Carotid artery stenosis 11/20/2018       Allergies:Lisinopril    Current Outpatient Medications Ordered in Epic   Medication Sig Dispense Refill   • predniSONE (DELTASONE) 10 MG Tab 4 PO QD X 4 day, 3 PO QD x 3 days, 2 PO QD x 2 days, 1 PO QD x 1 day 30 Tab 0   • amLODIPine (NORVASC) 5 MG Tab      • PROPRANOLOL HCL PO Take  by mouth.     • propranolol (INDERAL) 10 MG Tab      • losartan (COZAAR) 50 MG Tab losartan 50 mg tablet   Take 1 tablet every day by oral route.     • losartan (COZAAR) 100 MG Tab losartan 100 mg tablet   Take 1 tablet every day by oral route for 90 days.     • hydrALAZINE (APRESOLINE) 25 MG Tab hydralazine 25 mg tablet   Take 1 tablet twice a day by oral route for 90 days.   Take when blood pressure is over 160     • tramadol (ULTRAM) 50 MG Tab tramadol 50 mg tablet   TAKE ONE TABLET BY MOUTH EVERY TWELVE HOURS AS NEEDED FOR 30 DAYS     • cloNIDine (CATAPRES) 0.1 MG/24HR PATCH WEEKLY patch clonidine 0.1 mg/24 hr weekly transdermal patch   Apply 1 patch every week by transdermal route.     • valacyclovir (VALTREX) 1 GM Tab Take 1 Tab by mouth 3 times a day for 7 days. 21 Tab 0   • methylPREDNISolone (MEDROL DOSEPAK) 4 MG Tablet Therapy Pack  "Take 1 Tab by mouth See Admin Instructions. 21 Tab 0   • losartan (COZAAR) 50 MG Tab Take 75 mg by mouth 2 Times a Day.     • NON SPECIFIED Unknown supplements   Butchers broom, vitamin e, and metabolism booster     • promethazine (PHENERGAN) 25 MG Tab Take 0.5 Tabs by mouth every 6 hours as needed. 30 Tab 0   • hydrALAZINE (APRESOLINE) 25 MG Tab Take 1 Tab by mouth 4 times a day as needed (SBP> 160). 90 Tab 0     Current Facility-Administered Medications Ordered in Epic   Medication Dose Route Frequency Provider Last Rate Last Dose   • ketorolac (TORADOL) injection 30 mg  30 mg Intramuscular Once Michael Espinoza P.A.-C.           Past Medical History:   Diagnosis Date   • Chronic venous insufficiency    • Dental disorder     upper dentures, lower implants   • Heart valve disease 2019    'leaky heart valve'   • Hypertension        Social History     Tobacco Use   • Smoking status: Former Smoker     Packs/day: 0.00   • Smokeless tobacco: Never Used   Substance Use Topics   • Alcohol use: No   • Drug use: No       Family Status   Relation Name Status   • Fa Lung cancer, at 71 (Not Specified)     Family History   Problem Relation Age of Onset   • Cancer Father        ROS:  Review of Systems   Constitutional: Negative for fever, chills, weight loss and malaise/fatigue.   HENT: Negative for ear pain, nosebleeds, congestion, sore throat and neck pain.    Eyes: Negative for blurred vision.   Respiratory: Negative for cough, sputum production, shortness of breath and wheezing.    Cardiovascular: Negative for chest pain, palpitations, orthopnea and leg swelling.   Gastrointestinal: Negative for heartburn, nausea, vomiting and abdominal pain.   Genitourinary: Negative for dysuria, urgency and frequency.     Exam:  /64 (BP Location: Right arm, Patient Position: Sitting, BP Cuff Size: Adult)   Pulse 68   Temp 36.2 °C (97.1 °F) (Temporal)   Resp 14   Ht 1.6 m (5' 3\")   Wt 70.3 kg (155 lb)   SpO2 95% "   General:  Well nourished, well developed female in NAD  Head:Normocephalic, atraumatic  Eyes: PERRLA, EOM within normal limits, no conjunctival injection, no scleral icterus, visual fields and acuity grossly intact.  Nose: Symmetrical without tenderness, no discharge.  Mouth: reasonable hygiene, no erythema exudates or tonsillar enlargement.  Neck: no masses, range of motion within normal limits, no tracheal deviation. No obvious thyroid enlargement.  Extremities: no clubbing, cyanosis, or edema.  Skin: She has a maculopapular rash in a dermatomal distribution starting at her upper back on the right and wrapping around to her right breast.  These maculopapular lesions are in various stages of healing    Please note that this dictation was created using voice recognition software. I have made every reasonable attempt to correct obvious errors, but I expect that there are errors of grammar and possibly content that I did not discover before finalizing the note.    Assessment/Plan:  1. Herpes zoster without complication     2. Post herpetic neuralgia  predniSONE (DELTASONE) 10 MG Tab    ketorolac (TORADOL) injection 30 mg       Followup with primary care in the next 7-10 days if not significantly improving, return to the urgent care or go to the emergency room sooner for any worsening of symptoms.

## 2020-02-25 ENCOUNTER — OFFICE VISIT (OUTPATIENT)
Dept: MEDICAL GROUP | Facility: PHYSICIAN GROUP | Age: 80
End: 2020-02-25
Payer: MEDICARE

## 2020-02-25 VITALS
HEIGHT: 63 IN | SYSTOLIC BLOOD PRESSURE: 140 MMHG | TEMPERATURE: 98.6 F | BODY MASS INDEX: 27.11 KG/M2 | HEART RATE: 60 BPM | DIASTOLIC BLOOD PRESSURE: 62 MMHG | RESPIRATION RATE: 16 BRPM | OXYGEN SATURATION: 97 % | WEIGHT: 153 LBS

## 2020-02-25 DIAGNOSIS — I10 BENIGN ESSENTIAL HYPERTENSION: ICD-10-CM

## 2020-02-25 DIAGNOSIS — Z78.0 POST-MENOPAUSAL: ICD-10-CM

## 2020-02-25 DIAGNOSIS — B02.9 HERPES ZOSTER WITHOUT COMPLICATION: ICD-10-CM

## 2020-02-25 DIAGNOSIS — I87.8 VENOUS STASIS: ICD-10-CM

## 2020-02-25 PROBLEM — K76.89 LIVER CYST: Status: RESOLVED | Noted: 2019-07-23 | Resolved: 2020-02-25

## 2020-02-25 PROBLEM — R93.5 ABNORMAL CT OF THE ABDOMEN: Status: RESOLVED | Noted: 2019-08-20 | Resolved: 2020-02-25

## 2020-02-25 PROBLEM — R10.9 ABDOMINAL PAIN: Status: RESOLVED | Noted: 2019-08-20 | Resolved: 2020-02-25

## 2020-02-25 PROCEDURE — 99213 OFFICE O/P EST LOW 20 MIN: CPT | Performed by: NURSE PRACTITIONER

## 2020-02-25 RX ORDER — GABAPENTIN 100 MG/1
100 CAPSULE ORAL
COMMUNITY
Start: 2020-02-04

## 2020-02-25 SDOH — HEALTH STABILITY: MENTAL HEALTH: HOW OFTEN DO YOU HAVE 6 OR MORE DRINKS ON ONE OCCASION?: NEVER

## 2020-02-25 SDOH — HEALTH STABILITY: MENTAL HEALTH: HOW OFTEN DO YOU HAVE A DRINK CONTAINING ALCOHOL?: NEVER

## 2020-02-25 ASSESSMENT — PATIENT HEALTH QUESTIONNAIRE - PHQ9: CLINICAL INTERPRETATION OF PHQ2 SCORE: 0

## 2020-02-25 NOTE — PROGRESS NOTES
CC: Establish care, shingles    HISTORY OF THE PRESENT ILLNESS: Patient is a 79 y.o. female. This pleasant patient is here today to establish care and for evaluation and management of the following health problems.  Patient's previous primary care provider is Dr. Munoz.    Health Maintenance:  Patient declined flu vaccine, though we reviewed benefits of flu vaccine.  Encouraged him to wash his hands frequently and stay out of environments with people who are ill.  Patient will consider pneumonia vaccine and let me know at next appointment.  She is very interested in having shingles vaccine.  Would like her to wait until shingles has cleared up.      Benign essential hypertension  Patient is 79-year-old female here to establish care with me today.  Managed by cardiology, .  Patient is unclear on doses of medication, but does report she takes propranolol, losartan routinely.  Takes hydralazine as needed for systolic blood pressures greater than 160.  Reportedly stopped amlodipine after brief trial because it caused headache and fatigue.  Patient recently saw cardiology and due for follow-up again in 4 months.  Blood pressure today is 140/62.  The patient denies chest pain, shortness of breath, headache, vision changes, epistaxis, or dyspnea on exertion.    Venous stasis  Patient is 79-year-old female here to establish care with me today.  Reports history of venous stasis.  Recently had bilateral lower extremity venous ablation with good results.  Reports has not had any edema or erythema.    Herpes zoster without complication  Patient is 79-year-old female here to establish care with me today.  Reports recent shingles outbreak on right axilla a month ago.  Has completed Valtrex and prednisone.  Reports continued pain and pruritus.  She was prescribed gabapentin by her cardiologist, which seems to have helped a great deal with the pain.  Reports she still has a red rash.  Denies drainage, swelling,  fever.      Allergies: Lisinopril    Current Outpatient Medications Ordered in Epic   Medication Sig Dispense Refill   • gabapentin (NEURONTIN) 100 MG Cap Take 100 Caps by mouth.     • propranolol (INDERAL) 10 MG Tab      • losartan (COZAAR) 50 MG Tab Take 50 mg by mouth 2 Times a Day.     • promethazine (PHENERGAN) 25 MG Tab Take 0.5 Tabs by mouth every 6 hours as needed. 30 Tab 0   • hydrALAZINE (APRESOLINE) 25 MG Tab hydralazine 25 mg tablet   Take 1 tablet twice a day by oral route for 90 days.   Take when blood pressure is over 160     • NON SPECIFIED Unknown supplements   Butchers broom, vitamin e, and metabolism booster       No current Epic-ordered facility-administered medications on file.        Past Medical History:   Diagnosis Date   • Chronic venous insufficiency 2018   • Dental disorder     upper dentures, lower implants   • Heart valve disease 08/2019    'leaky heart valve'   • Hypertension        Past Surgical History:   Procedure Laterality Date   • [place] LAP,BILIARY TRACT,UNLISTED  9/9/2019    Procedure: BIOPSY, LIVER, LAPAROSCOPIC - FOR LIVER CYSTECTOMY, MARSUPIALIZATION;  Surgeon: Vinny Aquino M.D.;  Location: SURGERY [facility];  Service: General   • SCLEROTHERAPHY Right 10/2018       Social History     Tobacco Use   • Smoking status: Former Smoker     Packs/day: 1.00     Years: 10.00     Pack years: 10.00     Types: Cigarettes     Start date: 1955   • Smokeless tobacco: Never Used   Substance Use Topics   • Alcohol use: Not Currently     Frequency: Never     Binge frequency: Never   • Drug use: No       Family History   Problem Relation Age of Onset   • Cancer Father    • No Known Problems Mother    • No Known Problems Brother        ROS:     - Constitutional: Negative for fever, chills, unexpected weight change, and fatigue/generalized weakness.     - HEENT: Negative for headaches, vision changes, hearing changes, ear pain, rhinorrhea, sinus congestion, and sore throat.      -  "Respiratory: Negative for cough, dyspnea, and wheezing.      - Cardiovascular: Negative for chest pain, palpitations, orthopnea, and bilateral lower extremity edema.     - Gastrointestinal: Negative for heartburn, nausea, vomiting, abdominal pain, hematochezia, melena, diarrhea, constipation.     - Genitourinary: Negative for dysuria, polyuria, hematuria, pyuria, urinary urgency, and urinary incontinence.    - Musculoskeletal: Negative for myalgias, back pain, and joint pain.     - Skin: As in HPI.     - Neurological: Negative for dizziness, tingling, tremors, focal sensory deficit, focal weakness and headaches.     - Endo/Heme/Allergies: Does not bruise/bleed easily.     - Psychiatric/Behavioral: Negative for depression, suicidal/homicidal ideation and memory loss.           Exam: /62 (BP Location: Left arm, Patient Position: Sitting, BP Cuff Size: Adult)   Pulse 60   Temp 37 °C (98.6 °F) (Temporal)   Resp 16   Ht 1.6 m (5' 3\")   Wt 69.4 kg (153 lb)   SpO2 97%  Body mass index is 27.1 kg/m².    General: Alert, pleasant, well nourished, well developed female in NAD  HEENT: Normocephalic. Eyes conjunctiva clear lids without ptosis, pupils equal and reactive to light, ears normal shape and contour, canals are clear bilaterally, tympanic membranes are pearly gray with good light reflex, nasal mucosa without erythema and drainage, oropharynx is without erythema, edema or exudates.   Neck: Supple without bruit. Thyroid is not enlarged.  Pulmonary: Clear to ausculation.  Normal effort. No rales, ronchi, or wheezing.  Cardiovascular: Normal rate and rhythm without murmur. Carotid and radial pulses are intact and equal bilaterally.  No lower extremity edema.  Abdomen: Soft, nontender, nondistended. Normal bowel sounds. Liver and spleen are not palpable  Neurologic: Grossly nonfocal  Lymph: No cervical or supraclavicular lymph nodes are palpable  Skin: Warm and dry.  Right axilla with erythema, no swelling, " scattered small crusted lesions.  Musculoskeletal: Normal gait.   Psych: Normal mood and affect. Alert and oriented. Judgment and insight is normal.    Please note that this dictation was created using voice recognition software. I have made every reasonable attempt to correct obvious errors, but I expect that there are errors of grammar and possibly content that I did not discover before finalizing the note.      Assessment/Plan  1. Benign essential hypertension  Continue medications, no changes.  Managed by cardiology.  Continue with cardiology follow-up.  - Comp Metabolic Panel; Future  - Lipid Profile; Future  - ESTIMATED GFR; Future    2. Post-menopausal  Reviewed rationale for bone density scan.  Patient open to referral.  - DS-BONE DENSITY STUDY (DEXA); Future    3. Venous stasis  Patient reports venous stasis has resolved since vein ablation.    4. Herpes zoster without complication  Vesicles have dissipated.  Some erythema remains.  Patient will trial triamcinolone cream.  I did offer to prescribe the medication, but patient says she has some at home.  I advised her only to use it twice a day and not for more than 2 weeks at a time.    Patient will return to clinic in 1 month for lab review and follow-up on shingles rash.  Will return to clinic sooner if needed.

## 2020-02-26 NOTE — ASSESSMENT & PLAN NOTE
Patient is 79-year-old female here to establish care with me today.  Managed by cardiology, .  Patient is unclear on doses of medication, but does report she takes propranolol, losartan routinely.  Takes hydralazine as needed for systolic blood pressures greater than 160.  Reportedly stopped amlodipine after brief trial because it caused headache and fatigue.  Patient recently saw cardiology and due for follow-up again in 4 months.  Blood pressure today is 140/62.  The patient denies chest pain, shortness of breath, headache, vision changes, epistaxis, or dyspnea on exertion.

## 2020-02-26 NOTE — ASSESSMENT & PLAN NOTE
Patient is 79-year-old female here to establish care with me today.  Reports history of venous stasis.  Recently had bilateral lower extremity venous ablation with good results.  Reports has not had any edema or erythema.

## 2020-02-26 NOTE — ASSESSMENT & PLAN NOTE
Patient is 79-year-old female here to establish care with me today.  Reports recent shingles outbreak on right axilla a month ago.  Has completed Valtrex and prednisone.  Reports continued pain and pruritus.  She was prescribed gabapentin by her cardiologist, which seems to have helped a great deal with the pain.  Reports she still has a red rash.  Denies drainage, swelling, fever.

## 2020-03-25 ENCOUNTER — OFFICE VISIT (OUTPATIENT)
Dept: MEDICAL GROUP | Facility: PHYSICIAN GROUP | Age: 80
End: 2020-03-25
Payer: MEDICARE

## 2020-03-25 DIAGNOSIS — B02.9 HERPES ZOSTER WITHOUT COMPLICATION: ICD-10-CM

## 2020-03-25 DIAGNOSIS — M25.571 RIGHT ANKLE PAIN, UNSPECIFIED CHRONICITY: ICD-10-CM

## 2020-03-25 DIAGNOSIS — I10 BENIGN ESSENTIAL HYPERTENSION: ICD-10-CM

## 2020-03-25 DIAGNOSIS — M85.80 OSTEOPENIA, UNSPECIFIED LOCATION: ICD-10-CM

## 2020-03-25 PROCEDURE — 99443 PR PHYSICIAN TELEPHONE EVALUATION 21-30 MIN: CPT | Performed by: NURSE PRACTITIONER

## 2020-03-25 NOTE — ASSESSMENT & PLAN NOTE
Patient's visit is via telephone today.  Patient reports ongoing right ankle pain for the last 2 months.  Denies injury, swelling, erythema.  Did have venous ablation about 6 months ago and is due for follow-up with [facility].  Pain is aggravated by walking.  Patient wonders if it is related to the venous ablation as this is the area that was bothering her prior to the procedure.  Did offer to order x-ray and have patient come in for follow-up.  Patient would prefer to follow-up with [facility] prior to considering x-ray.

## 2020-03-25 NOTE — ASSESSMENT & PLAN NOTE
Patient's visit is via telephone today.  Bone density scan reviewed with patient today.  She does have osteopenia.  Using FRAX assessment tool, patient is at 17% risk for major osteoporosis fracture and 5.4% for hip fracture.  Recommendations are to start on biphosphonate.  Patient reports last bone density scan was normal.  Not taking calcium supplement.  Does take vitamin D 10,000 units daily.  Denies history of fracture, does not drink alcohol, does not smoke.  Discussed recommendation for biphosphonate with patient.  Reviewed risks and side effects.  Patient would like to work on weightbearing exercise and calcium supplementation prior to considering biphosphonate.  Advised patient to supplement 1200 mg of calcium daily.  We will recheck bone density in 1 to 2 years.

## 2020-03-25 NOTE — ASSESSMENT & PLAN NOTE
Patient's visit is via telephone today.  She reports that area of shingles has greatly improved.  No longer red and lesions are gone.  Still taking gabapentin at bedtime as it helps with sleep.

## 2020-03-25 NOTE — PROGRESS NOTES
Telephone Appointment Visit   As a means of avoiding spread of COVID-19, this visit is being conducted by telephone. This telephone visit was initiated by the patient and they verbally consented.    Time at start of call: 9:24 am    Reason for Call:  Lab Follow-up    Patient Comments / History:       Herpes zoster without complication  Patient's visit is via telephone today.  She reports that area of shingles has greatly improved.  No longer red and lesions are gone.  Still taking gabapentin at bedtime as it helps with sleep.    Osteopenia  Patient's visit is via telephone today.  Bone density scan reviewed with patient today.  She does have osteopenia.  Using FRAX assessment tool, patient is at 17% risk for major osteoporosis fracture and 5.4% for hip fracture.  Recommendations are to start on biphosphonate.  Patient reports last bone density scan was normal.  Not taking calcium supplement.  Does take vitamin D 10,000 units daily.  Denies history of fracture, does not drink alcohol, does not smoke.  Discussed recommendation for biphosphonate with patient.  Reviewed risks and side effects.  Patient would like to work on weightbearing exercise and calcium supplementation prior to considering biphosphonate.  Advised patient to supplement 1200 mg of calcium daily.  We will recheck bone density in 1 to 2 years.    Benign essential hypertension  Patient's visit is via telephone today.  Chronic health problem, managed by cardiology.  She reports she has been working on a new diet for the last 2 months called [facility]. [facility].  Reports its plant-based.  Has lost 7 pounds in the last 2 months.  Feels more energetic.  Reports blood pressures have been low, not needing to take her hydralazine.  Denies lightheadedness.  Patient will follow-up with cardiology.    Right ankle pain  Patient's visit is via telephone today.  Patient reports ongoing right ankle pain for the last 2 months.  Denies injury, swelling, erythema.   Did have venous ablation about 6 months ago and is due for follow-up with [facility].  Pain is aggravated by walking.  Patient wonders if it is related to the venous ablation as this is the area that was bothering her prior to the procedure.  Did offer to order x-ray and have patient come in for follow-up.  Patient would prefer to follow-up with [facility] prior to considering x-ray.      Labs / Images Reviewed   Labs reviewed with patient.  Scanned into media.  CMP normal except for mildly elevated glucose at 104.  Estimated GFR is 70.  Lipid profile is normal with exception of HDL at 43.  Bone density scan in media.    Assessment and Plan:     1. Herpes zoster without complication  Improved.  No new lesions.  Continue with gabapentin as needed.    2. Osteopenia, unspecified location  Reviewed recommendations.  Patient would prefer to start calcium supplementation, 1200 mg daily.  Continue with vitamin D.  Weightbearing exercise.  Continue to monitor.    3. Benign essential hypertension  Reportedly improved.  Patient will continue to monitor and follow-up with cardiology.    4. Right ankle pain, unspecified chronicity  Reviewed with patient that it may not likely be related to vein ablation as there is no edema or erythema.  Patient will schedule follow-up appointment with [facility].  She will let me know if would like imaging.      Follow-up: 6 months or sooner if needed.  Time at end of call: 9:45  Total Time Spent: 11-20 minutes    JIL Matos.

## 2020-09-11 ENCOUNTER — OFFICE VISIT (OUTPATIENT)
Dept: MEDICAL GROUP | Facility: PHYSICIAN GROUP | Age: 80
End: 2020-09-11
Payer: MEDICARE

## 2020-09-11 VITALS
SYSTOLIC BLOOD PRESSURE: 120 MMHG | RESPIRATION RATE: 14 BRPM | WEIGHT: 150 LBS | BODY MASS INDEX: 26.58 KG/M2 | TEMPERATURE: 98.7 F | DIASTOLIC BLOOD PRESSURE: 72 MMHG | HEART RATE: 60 BPM | OXYGEN SATURATION: 96 % | HEIGHT: 63 IN

## 2020-09-11 DIAGNOSIS — I10 BENIGN ESSENTIAL HYPERTENSION: ICD-10-CM

## 2020-09-11 PROCEDURE — 99213 OFFICE O/P EST LOW 20 MIN: CPT | Performed by: NURSE PRACTITIONER

## 2020-09-11 ASSESSMENT — FIBROSIS 4 INDEX: FIB4 SCORE: 1.44

## 2020-09-11 NOTE — ASSESSMENT & PLAN NOTE
Chronic health problem, managed by cardiology, Dr. Layton.  Taking losartan, hydralazine.  Patient does report she was at [facility] in July for hypertension.  She is unclear on what interventions were taken.  I will request notes.  The patient denies chest pain, shortness of breath, headache, vision changes, epistaxis, or dyspnea on exertion.

## 2020-09-11 NOTE — PROGRESS NOTES
CC: [facility] paperwork completion    HISTORY OF THE PRESENT ILLNESS: Patient is a 80 y.o. female. This pleasant patient is here today for evaluation and management of the following health problems.    She wants to have labs done prior to today's appointment.  She reports she had labs done while at [facility].  I have requested records.      Patient would like [facility] paperwork completed today.  She has appointment with optometry tomorrow morning.    Patient reports she has established with homeopath.  Reports her thyroid was out of range and is receiving supplements that are helping with her energy level.      Benign essential hypertension  Chronic health problem, managed by cardiology, Dr. Layton.  Patient does report she was at [facility] in July for hypertension.  She is unclear on what interventions were taken.  I will request notes.          Allergies: Lisinopril    Current Outpatient Medications Ordered in Epic   Medication Sig Dispense Refill   • propranolol (INDERAL) 10 MG Tab      • hydrALAZINE (APRESOLINE) 25 MG Tab hydralazine 25 mg tablet   Take 1 tablet twice a day by oral route for 90 days.   Take when blood pressure is over 160     • losartan (COZAAR) 50 MG Tab Take 50 mg by mouth 2 Times a Day.     • NON SPECIFIED Unknown supplements   Butchers broom, vitamin e, and metabolism booster     • gabapentin (NEURONTIN) 100 MG Cap Take 100 Caps by mouth.       No current Epic-ordered facility-administered medications on file.        Past Medical History:   Diagnosis Date   • Chronic venous insufficiency 2018   • Dental disorder     upper dentures, lower implants   • Heart valve disease 08/2019    'leaky heart valve'   • Hypertension        Past Surgical History:   Procedure Laterality Date   • [place] LAP,BILIARY TRACT,UNLISTED  9/9/2019    Procedure: BIOPSY, LIVER, LAPAROSCOPIC - FOR LIVER CYSTECTOMY, MARSUPIALIZATION;  Surgeon: Vinny Aquino M.D.;  Location: SURGERY [facility];  Service: General   •  "SCLEROTHERAPHY Right 10/2018       Social History     Tobacco Use   • Smoking status: Former Smoker     Packs/day: 1.00     Years: 10.00     Pack years: 10.00     Types: Cigarettes     Start date: 1955   • Smokeless tobacco: Never Used   Substance Use Topics   • Alcohol use: Not Currently     Frequency: Never     Binge frequency: Never   • Drug use: No       Family History   Problem Relation Age of Onset   • Cancer Father    • No Known Problems Mother    • No Known Problems Brother        ROS:   As in HPI, otherwise negative for chest pain, dyspnea, abdominal pain, dysuria, blood in stool, fever          Exam: /72 (BP Location: Left arm, Patient Position: Sitting, BP Cuff Size: Adult)   Pulse 60   Temp 37.1 °C (98.7 °F) (Temporal)   Resp 14   Ht 1.6 m (5' 3\")   Wt 68 kg (150 lb)   SpO2 96%  Body mass index is 26.57 kg/m².    General: Alert, pleasant, well nourished, well developed female in NAD  Neck: Supple without bruit.   Pulmonary: Clear to ausculation.  Normal effort. No rales, ronchi, or wheezing.  Cardiovascular: Normal rate and rhythm without murmur. Carotid and radial pulses are intact and equal bilaterally.  No lower extremity edema.  Abdomen: Soft, nontender, nondistended.  Neurologic: Grossly nonfocal  Skin: Warm and dry.    Musculoskeletal: Normal gait.   Psych: Normal mood and affect. Alert and oriented. Judgment and insight is normal.    Please note that this dictation was created using voice recognition software. I have made every reasonable attempt to correct obvious errors, but I expect that there are errors of grammar and possibly content that I did not discover before finalizing the note.      Assessment/Plan  1. Benign essential hypertension  Continue follow-up with Dr. Layton.  Will request records from [facility].    DMSATYA paperwork signed.  Patient has no medical conditions or medications that would act her driving ability.  "

## 2021-01-11 DIAGNOSIS — Z23 NEED FOR VACCINATION: ICD-10-CM

## 2021-03-30 ENCOUNTER — TELEPHONE (OUTPATIENT)
Dept: SURGICAL ONCOLOGY | Facility: MEDICAL CENTER | Age: 81
End: 2021-03-30

## 2021-03-30 NOTE — TELEPHONE ENCOUNTER
Patient called after having a procedure done 9/9/19 w/Dr Aquino. Wondering if the procedure she had done with him could cause her blood pressure to rise at night when she is lays down. I discussed this with Dr IRWIN and he stated NO and that she should follow up her primary doctor. She stated that he wouldn't know how to mange it. I again informed her that she should go see him.

## 2022-06-22 ENCOUNTER — APPOINTMENT (RX ONLY)
Dept: URBAN - NONMETROPOLITAN AREA CLINIC 15 | Facility: CLINIC | Age: 82
Setting detail: DERMATOLOGY
End: 2022-06-22

## 2022-06-22 DIAGNOSIS — L29.8 OTHER PRURITUS: ICD-10-CM

## 2022-06-22 DIAGNOSIS — L23.9 ALLERGIC CONTACT DERMATITIS, UNSPECIFIED CAUSE: ICD-10-CM

## 2022-06-22 DIAGNOSIS — L29.89 OTHER PRURITUS: ICD-10-CM

## 2022-06-22 PROCEDURE — ? INJECTION

## 2022-06-22 PROCEDURE — 99203 OFFICE O/P NEW LOW 30 MIN: CPT | Mod: 25

## 2022-06-22 PROCEDURE — 96372 THER/PROPH/DIAG INJ SC/IM: CPT

## 2022-06-22 PROCEDURE — ? COUNSELING

## 2022-06-22 PROCEDURE — ? PRESCRIPTION

## 2022-06-22 RX ORDER — CLOBETASOL PROPIONATE 0.5 MG/G
1 CREAM TOPICAL BID
Qty: 60 | Refills: 3 | Status: ERX | COMMUNITY
Start: 2022-06-22

## 2022-06-22 RX ADMIN — CLOBETASOL PROPIONATE 1: 0.5 CREAM TOPICAL at 00:00

## 2022-06-22 ASSESSMENT — LOCATION SIMPLE DESCRIPTION DERM
LOCATION SIMPLE: RIGHT THIGH
LOCATION SIMPLE: RIGHT BUTTOCK
LOCATION SIMPLE: LEFT THIGH

## 2022-06-22 ASSESSMENT — LOCATION ZONE DERM
LOCATION ZONE: TRUNK
LOCATION ZONE: LEG

## 2022-06-22 ASSESSMENT — LOCATION DETAILED DESCRIPTION DERM
LOCATION DETAILED: LEFT ANTERIOR PROXIMAL THIGH
LOCATION DETAILED: LEFT ANTERIOR DISTAL THIGH
LOCATION DETAILED: RIGHT ANTERIOR PROXIMAL THIGH
LOCATION DETAILED: RIGHT ANTERIOR DISTAL THIGH
LOCATION DETAILED: RIGHT BUTTOCK

## 2022-06-22 NOTE — PROCEDURE: COUNSELING
Detail Level: Detailed
Detail Level: Zone
Patient Specific Counseling (Will Not Stick From Patient To Patient): Recommended Claritin and Zyrtec twice daily.

## 2022-06-22 NOTE — PROCEDURE: INJECTION
Detail Level: None
Units: cc
Administered By (Optional): Martínez
Total Volume Injected In Cc (Will Not Affected Billing): 1.0
Route: IM
Dose Administered (Numbers Only - Mg, G, Mcg, Units, Cc): 0
Expiration Date (Optional): 10/2023
Post-Care Instructions: I reviewed with the patient in detail post-care instructions. Patient understands to keep the injection sites clean and call the clinic if there is any redness, swelling or pain.
Medication (1) And Associated J-Code Units: Triamcinolone acetonide, 10mg
Procedure Information: Please note that the numeric value listed in the Medication (1) and associated J-code units and Medication (2) and associated J-code units variables are j-code amounts and do not represent either the concentration or the total amount of the medications injected.  I strongly recommend selecting no to the Render J-code information in note question. This will allow your note to be more clear. If you are billing j-codes with your injection codes you need to document the total amount of the medication injected. This amount should match the j-code units. For example, if you are injecting Triamcinolone 40mg as an intramuscular injection you would select 40 for the dose field and mg for the units. This would allow you to document  with 4 units (40mg = 10mg x 4). The total volume is not used to calculate j-codes only the amount of the medication administered.
Lot # (Optional): [id number]
Hide Second Medication?: No
Consent: The risks of the medication was reviewed with the patient.
Treatment Number: 1

## 2023-08-31 NOTE — HPI: RASH
PDMP reviewed; no aberrant behavior identified, prescription authorized.     Methylphenidate HCl  36MG / Tablet Extended Release  Rx# [id number] Stimulant Qty:Â 22  Days:Â 22  Refills:Â 0 Prescribed: 8/11/2023  Dispensed: 8/11/2023  Sold: 8/11/2023 [address] [place] [address] [phone]
What Type Of Note Output Would You Prefer (Optional)?: Standard Output
How Severe Is Your Rash?: moderate
Is This A New Presentation, Or A Follow-Up?: Rash

## 2025-05-12 NOTE — ANESTHESIA PROCEDURE NOTES
Airway  Date/Time: 9/9/2019 7:15 AM  Performed by: Rafy Birch D.O.  Authorized by: Rafy Birch D.O.     Location:  OR  Urgency:  Elective  Indications for Airway Management:  Anesthesia  Spontaneous Ventilation: absent    Sedation Level:  Deep  Preoxygenated: Yes    Patient Position:  Sniffing  Final Airway Type:  Endotracheal airway  Final Endotracheal Airway:  ETT  Cuffed: Yes    Technique Used for Successful ETT Placement:  Direct laryngoscopy  Insertion Site:  Oral  Blade Type:  Caden  Laryngoscope Blade/Videolaryngoscope Blade Size:  3  ETT Size (mm):  7.0  Measured from:  Lips  ETT to Lips (cm):  20  Placement Verified by: auscultation and capnometry    Cormack-Lehane Classification:  Grade I - full view of glottis  Number of Attempts at Approach:  1         

## (undated) DEVICE — SENSOR SPO2 NEO LNCS ADHESIVE (20/BX) SEE USER NOTES

## (undated) DEVICE — SUTURE 0 VICRYL PLUS UR-6 - 27 INCH (36/BX)

## (undated) DEVICE — SET LEADWIRE 5 LEAD BEDSIDE DISPOSABLE ECG (1SET OF 5/EA)

## (undated) DEVICE — TROCAR 5X100 NON BLADED Z-TH - READ KII (6/BX)

## (undated) DEVICE — SPONGE GAUZESTER. 2X2 4-PL - (2/PK 50PK/BX 30BX/CS)

## (undated) DEVICE — MASK ANESTHESIA ADULT  - (100/CA)

## (undated) DEVICE — GOWN SURGICAL X-LARGE ULTRA - FILM-REINFORCED (20/CA)

## (undated) DEVICE — TUBE E-T HI-LO CUFF 7.0MM (10EA/PK)

## (undated) DEVICE — HEAD HOLDER JUNIOR/ADULT

## (undated) DEVICE — DRAPE IOBAN II INCISE 23X17 - (10EA/BX 4BX/CA)

## (undated) DEVICE — GLOVE BIOGEL SZ 8 SURGICAL PF LTX - (50PR/BX 4BX/CA)

## (undated) DEVICE — DRESSING TRANSPARENT FILM TEGADERM 2.375 X 2.75"  (100EA/BX)"

## (undated) DEVICE — GLOVE SZ 7 BIOGEL PI MICRO - PF LF (50PR/BX 4BX/CA)

## (undated) DEVICE — SUCTION INSTRUMENT YANKAUER BULBOUS TIP W/O VENT (50EA/CA)

## (undated) DEVICE — KIT ROOM DECONTAMINATION

## (undated) DEVICE — SET EXTENSION WITH 2 PORTS (48EA/CA) ***PART #2C8610 IS A SUBSTITUTE*****

## (undated) DEVICE — SLEEVE, VASO, THIGH, MED

## (undated) DEVICE — TROCAR Z THREAD12MM OPTICAL - NON BLADED (6/BX)

## (undated) DEVICE — SET SUCTION/IRRIGATION WITH DISPOSABLE TIP (6/CA )PART #0250-070-520 IS A SUB

## (undated) DEVICE — LACTATED RINGERS INJ 1000 ML - (14EA/CA 60CA/PF)

## (undated) DEVICE — ELECTRODE DUAL RETURN W/ CORD - (50/PK)

## (undated) DEVICE — TUBING CLEARLINK DUO-VENT - C-FLO (48EA/CA)

## (undated) DEVICE — SUTURE GENERAL

## (undated) DEVICE — TUBE CONNECT SUCTION CLEAR 120 X 1/4" (50EA/CA)"

## (undated) DEVICE — DRESSING NON-ADHERING 8 X 3 - (50/BX)

## (undated) DEVICE — KIT ANESTHESIA W/CIRCUIT & 3/LT BAG W/FILTER (20EA/CA)

## (undated) DEVICE — CANISTER SUCTION 3000ML MECHANICAL FILTER AUTO SHUTOFF MEDI-VAC NONSTERILE LF DISP  (40EA/CA)

## (undated) DEVICE — STAPLER SKIN DISP - (6/BX 10BX/CA) VISISTAT

## (undated) DEVICE — CHLORAPREP 26 ML APPLICATOR - ORANGE TINT(25/CA)

## (undated) DEVICE — ELECTRODE 850 FOAM ADHESIVE - HYDROGEL RADIOTRNSPRNT (50/PK)

## (undated) DEVICE — GLOVE BIOGEL PI INDICATOR SZ 7.0 SURGICAL PF LF - (50/BX 4BX/CA)

## (undated) DEVICE — NEPTUNE 4 PORT MANIFOLD - (20/PK)

## (undated) DEVICE — PROTECTOR ULNA NERVE - (36PR/CA)

## (undated) DEVICE — GOWN WARMING STANDARD FLEX - (30/CA)

## (undated) DEVICE — PACK LAP CHOLE OR - (2EA/CA)

## (undated) DEVICE — APPLIER 5MM MED/LARGE CLIP - (3/BX)